# Patient Record
Sex: FEMALE | Race: WHITE | NOT HISPANIC OR LATINO | Employment: OTHER | URBAN - METROPOLITAN AREA
[De-identification: names, ages, dates, MRNs, and addresses within clinical notes are randomized per-mention and may not be internally consistent; named-entity substitution may affect disease eponyms.]

---

## 2018-05-01 ENCOUNTER — RECORDS - HEALTHEAST (OUTPATIENT)
Dept: LAB | Facility: CLINIC | Age: 83
End: 2018-05-01

## 2018-05-04 LAB — BACTERIA SPEC CULT: ABNORMAL

## 2018-07-19 ENCOUNTER — RECORDS - HEALTHEAST (OUTPATIENT)
Dept: LAB | Facility: CLINIC | Age: 83
End: 2018-07-19

## 2018-07-20 ENCOUNTER — RECORDS - HEALTHEAST (OUTPATIENT)
Dept: LAB | Facility: CLINIC | Age: 83
End: 2018-07-20

## 2018-07-20 LAB
ALBUMIN SERPL-MCNC: 3.5 G/DL (ref 3.5–5)
ALP SERPL-CCNC: 121 U/L (ref 45–120)
ALT SERPL W P-5'-P-CCNC: <9 U/L (ref 0–45)
ANION GAP SERPL CALCULATED.3IONS-SCNC: 11 MMOL/L (ref 5–18)
AST SERPL W P-5'-P-CCNC: 18 U/L (ref 0–40)
BILIRUB SERPL-MCNC: 0.4 MG/DL (ref 0–1)
BUN SERPL-MCNC: 31 MG/DL (ref 8–28)
CALCIUM SERPL-MCNC: 9.4 MG/DL (ref 8.5–10.5)
CHLORIDE BLD-SCNC: 103 MMOL/L (ref 98–107)
CO2 SERPL-SCNC: 27 MMOL/L (ref 22–31)
CREAT SERPL-MCNC: 1.29 MG/DL (ref 0.6–1.1)
GFR SERPL CREATININE-BSD FRML MDRD: 39 ML/MIN/1.73M2
GLUCOSE BLD-MCNC: 118 MG/DL (ref 70–125)
POTASSIUM BLD-SCNC: 4.7 MMOL/L (ref 3.5–5)
PROT SERPL-MCNC: 6.9 G/DL (ref 6–8)
SODIUM SERPL-SCNC: 141 MMOL/L (ref 136–145)
VIT B12 SERPL-MCNC: 636 PG/ML (ref 213–816)

## 2018-07-21 LAB — BACTERIA SPEC CULT: NO GROWTH

## 2018-08-23 ENCOUNTER — RECORDS - HEALTHEAST (OUTPATIENT)
Dept: LAB | Facility: CLINIC | Age: 83
End: 2018-08-23

## 2018-08-26 LAB — BACTERIA SPEC CULT: ABNORMAL

## 2018-09-06 ENCOUNTER — RECORDS - HEALTHEAST (OUTPATIENT)
Dept: LAB | Facility: CLINIC | Age: 83
End: 2018-09-06

## 2018-09-08 LAB — BACTERIA SPEC CULT: NO GROWTH

## 2018-10-22 ENCOUNTER — COMMUNICATION - HEALTHEAST (OUTPATIENT)
Dept: GERIATRICS | Facility: CLINIC | Age: 83
End: 2018-10-22

## 2018-10-23 ENCOUNTER — OFFICE VISIT - HEALTHEAST (OUTPATIENT)
Dept: GERIATRICS | Facility: CLINIC | Age: 83
End: 2018-10-23

## 2018-10-23 DIAGNOSIS — J44.9 CHRONIC OBSTRUCTIVE PULMONARY DISEASE, UNSPECIFIED COPD TYPE (H): ICD-10-CM

## 2018-10-23 DIAGNOSIS — M25.551 RIGHT HIP PAIN: ICD-10-CM

## 2018-10-23 DIAGNOSIS — I25.83 CORONARY ARTERY DISEASE DUE TO LIPID RICH PLAQUE: ICD-10-CM

## 2018-10-23 DIAGNOSIS — R53.81 PHYSICAL DECONDITIONING: ICD-10-CM

## 2018-10-23 DIAGNOSIS — K59.09 OTHER CONSTIPATION: ICD-10-CM

## 2018-10-23 DIAGNOSIS — I25.10 CORONARY ARTERY DISEASE DUE TO LIPID RICH PLAQUE: ICD-10-CM

## 2018-10-23 DIAGNOSIS — K21.9 GASTROESOPHAGEAL REFLUX DISEASE, ESOPHAGITIS PRESENCE NOT SPECIFIED: ICD-10-CM

## 2018-10-24 ENCOUNTER — OFFICE VISIT - HEALTHEAST (OUTPATIENT)
Dept: GERIATRICS | Facility: CLINIC | Age: 83
End: 2018-10-24

## 2018-10-24 DIAGNOSIS — K21.9 GASTROESOPHAGEAL REFLUX DISEASE, ESOPHAGITIS PRESENCE NOT SPECIFIED: ICD-10-CM

## 2018-10-24 DIAGNOSIS — I25.10 CORONARY ARTERY DISEASE DUE TO LIPID RICH PLAQUE: ICD-10-CM

## 2018-10-24 DIAGNOSIS — K59.00 CONSTIPATION: ICD-10-CM

## 2018-10-24 DIAGNOSIS — J44.9 CHRONIC OBSTRUCTIVE PULMONARY DISEASE, UNSPECIFIED COPD TYPE (H): ICD-10-CM

## 2018-10-24 DIAGNOSIS — R07.9 ACUTE CHEST PAIN: ICD-10-CM
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
## 2018-10-24 DIAGNOSIS — I25.83 CORONARY ARTERY DISEASE DUE TO LIPID RICH PLAQUE: ICD-10-CM

## 2018-10-24 DIAGNOSIS — R53.81 PHYSICAL DECONDITIONING: ICD-10-CM

## 2018-10-24 DIAGNOSIS — S70.01XA CONTUSION OF RIGHT HIP: ICD-10-CM

## 2018-10-24 ASSESSMENT — MIFFLIN-ST. JEOR: SCORE: 801.4

## 2018-10-25 ENCOUNTER — OFFICE VISIT - HEALTHEAST (OUTPATIENT)
Dept: GERIATRICS | Facility: CLINIC | Age: 83
End: 2018-10-25

## 2018-10-25 DIAGNOSIS — I50.9 CHRONIC CONGESTIVE HEART FAILURE, UNSPECIFIED HEART FAILURE TYPE (H): ICD-10-CM

## 2018-10-25 DIAGNOSIS — K59.09 OTHER CONSTIPATION: ICD-10-CM

## 2018-10-25 DIAGNOSIS — K85.90 PANCREATITIS: ICD-10-CM

## 2018-10-25 DIAGNOSIS — K21.9 GASTROESOPHAGEAL REFLUX DISEASE, ESOPHAGITIS PRESENCE NOT SPECIFIED: ICD-10-CM

## 2018-10-26 ENCOUNTER — RECORDS - HEALTHEAST (OUTPATIENT)
Dept: LAB | Facility: CLINIC | Age: 83
End: 2018-10-26

## 2018-10-26 ENCOUNTER — COMMUNICATION - HEALTHEAST (OUTPATIENT)
Dept: GERIATRICS | Facility: CLINIC | Age: 83
End: 2018-10-26

## 2018-10-26 LAB
FOLATE SERPL-MCNC: 15.5 NG/ML
LIPASE SERPL-CCNC: 59 U/L (ref 0–52)
VIT B12 SERPL-MCNC: 875 PG/ML (ref 213–816)

## 2018-10-30 ENCOUNTER — OFFICE VISIT - HEALTHEAST (OUTPATIENT)
Dept: GERIATRICS | Facility: CLINIC | Age: 83
End: 2018-10-30

## 2018-10-30 DIAGNOSIS — I25.83 CORONARY ARTERY DISEASE DUE TO LIPID RICH PLAQUE: ICD-10-CM

## 2018-10-30 DIAGNOSIS — J44.9 CHRONIC OBSTRUCTIVE PULMONARY DISEASE, UNSPECIFIED COPD TYPE (H): ICD-10-CM

## 2018-10-30 DIAGNOSIS — I50.9 CHRONIC CONGESTIVE HEART FAILURE, UNSPECIFIED HEART FAILURE TYPE (H): ICD-10-CM

## 2018-10-30 DIAGNOSIS — M25.551 RIGHT HIP PAIN: ICD-10-CM

## 2018-10-30 DIAGNOSIS — I25.10 CORONARY ARTERY DISEASE DUE TO LIPID RICH PLAQUE: ICD-10-CM

## 2018-10-31 ENCOUNTER — OFFICE VISIT - HEALTHEAST (OUTPATIENT)
Dept: GERIATRICS | Facility: CLINIC | Age: 83
End: 2018-10-31

## 2018-10-31 DIAGNOSIS — R07.9 CHEST PAIN: ICD-10-CM

## 2018-10-31 DIAGNOSIS — K21.9 GASTROESOPHAGEAL REFLUX DISEASE, ESOPHAGITIS PRESENCE NOT SPECIFIED: ICD-10-CM

## 2018-10-31 DIAGNOSIS — M25.551 RIGHT HIP PAIN: ICD-10-CM

## 2018-10-31 DIAGNOSIS — R62.7 FAILURE TO THRIVE IN ADULT: ICD-10-CM

## 2018-11-01 ENCOUNTER — RECORDS - HEALTHEAST (OUTPATIENT)
Dept: LAB | Facility: CLINIC | Age: 83
End: 2018-11-01

## 2018-11-01 LAB — TROPONIN I SERPL-MCNC: <0.01 NG/ML (ref 0–0.29)

## 2018-11-06 ENCOUNTER — OFFICE VISIT - HEALTHEAST (OUTPATIENT)
Dept: GERIATRICS | Facility: CLINIC | Age: 83
End: 2018-11-06

## 2018-11-06 DIAGNOSIS — M25.551 RIGHT HIP PAIN: ICD-10-CM

## 2018-11-06 DIAGNOSIS — K21.9 GASTROESOPHAGEAL REFLUX DISEASE, ESOPHAGITIS PRESENCE NOT SPECIFIED: ICD-10-CM

## 2018-11-06 DIAGNOSIS — I20.89 OTHER FORMS OF ANGINA PECTORIS (H): ICD-10-CM

## 2018-11-06 DIAGNOSIS — R53.81 PHYSICAL DECONDITIONING: ICD-10-CM

## 2018-11-14 ENCOUNTER — OFFICE VISIT - HEALTHEAST (OUTPATIENT)
Dept: GERIATRICS | Facility: CLINIC | Age: 83
End: 2018-11-14

## 2018-11-14 DIAGNOSIS — I20.89 OTHER FORMS OF ANGINA PECTORIS (H): ICD-10-CM

## 2018-11-14 DIAGNOSIS — J44.9 CHRONIC OBSTRUCTIVE PULMONARY DISEASE, UNSPECIFIED COPD TYPE (H): ICD-10-CM

## 2018-11-14 DIAGNOSIS — I50.9 CHRONIC CONGESTIVE HEART FAILURE, UNSPECIFIED HEART FAILURE TYPE (H): ICD-10-CM

## 2018-11-14 DIAGNOSIS — M25.551 RIGHT HIP PAIN: ICD-10-CM

## 2018-11-14 DIAGNOSIS — K21.9 GASTROESOPHAGEAL REFLUX DISEASE, ESOPHAGITIS PRESENCE NOT SPECIFIED: ICD-10-CM

## 2018-11-16 ENCOUNTER — AMBULATORY - HEALTHEAST (OUTPATIENT)
Dept: GERIATRICS | Facility: CLINIC | Age: 83
End: 2018-11-16

## 2019-01-10 ENCOUNTER — RECORDS - HEALTHEAST (OUTPATIENT)
Dept: LAB | Facility: CLINIC | Age: 84
End: 2019-01-10

## 2019-01-11 LAB — BACTERIA SPEC CULT: NO GROWTH

## 2019-01-21 ASSESSMENT — MIFFLIN-ST. JEOR: SCORE: 770.1

## 2019-01-22 ENCOUNTER — SURGERY - HEALTHEAST (OUTPATIENT)
Dept: SURGERY | Facility: HOSPITAL | Age: 84
End: 2019-01-22

## 2019-01-22 ENCOUNTER — ANESTHESIA - HEALTHEAST (OUTPATIENT)
Dept: SURGERY | Facility: HOSPITAL | Age: 84
End: 2019-01-22

## 2019-01-22 ASSESSMENT — MIFFLIN-ST. JEOR: SCORE: 793.23

## 2019-01-23 ASSESSMENT — MIFFLIN-ST. JEOR: SCORE: 792.78

## 2019-01-24 ENCOUNTER — HOME CARE/HOSPICE - HEALTHEAST (OUTPATIENT)
Dept: HOME HEALTH SERVICES | Facility: HOME HEALTH | Age: 84
End: 2019-01-24

## 2019-01-27 ENCOUNTER — HOME CARE/HOSPICE - HEALTHEAST (OUTPATIENT)
Dept: HOME HEALTH SERVICES | Facility: HOME HEALTH | Age: 84
End: 2019-01-27

## 2019-01-30 ENCOUNTER — HOME CARE/HOSPICE - HEALTHEAST (OUTPATIENT)
Dept: HOME HEALTH SERVICES | Facility: HOME HEALTH | Age: 84
End: 2019-01-30

## 2019-02-01 ENCOUNTER — HOME CARE/HOSPICE - HEALTHEAST (OUTPATIENT)
Dept: HOME HEALTH SERVICES | Facility: HOME HEALTH | Age: 84
End: 2019-02-01

## 2019-02-04 ENCOUNTER — HOME CARE/HOSPICE - HEALTHEAST (OUTPATIENT)
Dept: HOME HEALTH SERVICES | Facility: HOME HEALTH | Age: 84
End: 2019-02-04

## 2019-02-08 ENCOUNTER — HOME CARE/HOSPICE - HEALTHEAST (OUTPATIENT)
Dept: HOME HEALTH SERVICES | Facility: HOME HEALTH | Age: 84
End: 2019-02-08

## 2019-03-20 ENCOUNTER — RECORDS - HEALTHEAST (OUTPATIENT)
Dept: LAB | Facility: CLINIC | Age: 84
End: 2019-03-20

## 2019-03-21 LAB
ALBUMIN SERPL-MCNC: 3.4 G/DL (ref 3.5–5)
ALP SERPL-CCNC: 230 U/L (ref 45–120)
ALT SERPL W P-5'-P-CCNC: 18 U/L (ref 0–45)
ANION GAP SERPL CALCULATED.3IONS-SCNC: 11 MMOL/L (ref 5–18)
AST SERPL W P-5'-P-CCNC: 29 U/L (ref 0–40)
BILIRUB SERPL-MCNC: 0.4 MG/DL (ref 0–1)
BUN SERPL-MCNC: 18 MG/DL (ref 8–28)
CALCIUM SERPL-MCNC: 9.3 MG/DL (ref 8.5–10.5)
CHLORIDE BLD-SCNC: 108 MMOL/L (ref 98–107)
CO2 SERPL-SCNC: 24 MMOL/L (ref 22–31)
CREAT SERPL-MCNC: 1.05 MG/DL (ref 0.6–1.1)
GFR SERPL CREATININE-BSD FRML MDRD: 49 ML/MIN/1.73M2
GLUCOSE BLD-MCNC: 119 MG/DL (ref 70–125)
POTASSIUM BLD-SCNC: 4.7 MMOL/L (ref 3.5–5)
PROT SERPL-MCNC: 6.5 G/DL (ref 6–8)
SODIUM SERPL-SCNC: 143 MMOL/L (ref 136–145)
TSH SERPL DL<=0.005 MIU/L-ACNC: 3.06 UIU/ML (ref 0.3–5)
VIT B12 SERPL-MCNC: 975 PG/ML (ref 213–816)

## 2019-05-15 ENCOUNTER — RECORDS - HEALTHEAST (OUTPATIENT)
Dept: LAB | Facility: CLINIC | Age: 84
End: 2019-05-15

## 2019-05-15 LAB
ALBUMIN SERPL-MCNC: 3.6 G/DL (ref 3.5–5)
ALP SERPL-CCNC: 127 U/L (ref 45–120)
ALT SERPL W P-5'-P-CCNC: 10 U/L (ref 0–45)
ANION GAP SERPL CALCULATED.3IONS-SCNC: 9 MMOL/L (ref 5–18)
AST SERPL W P-5'-P-CCNC: 17 U/L (ref 0–40)
BILIRUB SERPL-MCNC: 0.4 MG/DL (ref 0–1)
BUN SERPL-MCNC: 24 MG/DL (ref 8–28)
CALCIUM SERPL-MCNC: 9.6 MG/DL (ref 8.5–10.5)
CHLORIDE BLD-SCNC: 106 MMOL/L (ref 98–107)
CO2 SERPL-SCNC: 27 MMOL/L (ref 22–31)
CREAT SERPL-MCNC: 1.15 MG/DL (ref 0.6–1.1)
GFR SERPL CREATININE-BSD FRML MDRD: 44 ML/MIN/1.73M2
GLUCOSE BLD-MCNC: 107 MG/DL (ref 70–125)
POTASSIUM BLD-SCNC: 4.6 MMOL/L (ref 3.5–5)
PROT SERPL-MCNC: 6.6 G/DL (ref 6–8)
SODIUM SERPL-SCNC: 142 MMOL/L (ref 136–145)

## 2019-08-20 ENCOUNTER — RECORDS - HEALTHEAST (OUTPATIENT)
Dept: LAB | Facility: CLINIC | Age: 84
End: 2019-08-20

## 2019-08-24 LAB — BACTERIA SPEC CULT: NORMAL

## 2019-09-04 ENCOUNTER — RECORDS - HEALTHEAST (OUTPATIENT)
Dept: LAB | Facility: CLINIC | Age: 84
End: 2019-09-04

## 2019-09-05 LAB — BACTERIA SPEC CULT: NO GROWTH

## 2019-09-18 ENCOUNTER — RECORDS - HEALTHEAST (OUTPATIENT)
Dept: LAB | Facility: CLINIC | Age: 84
End: 2019-09-18

## 2019-09-19 LAB
ALBUMIN SERPL-MCNC: 3.7 G/DL (ref 3.5–5)
ALP SERPL-CCNC: 143 U/L (ref 45–120)
ALT SERPL W P-5'-P-CCNC: 12 U/L (ref 0–45)
ANION GAP SERPL CALCULATED.3IONS-SCNC: 7 MMOL/L (ref 5–18)
AST SERPL W P-5'-P-CCNC: 18 U/L (ref 0–40)
BILIRUB SERPL-MCNC: 0.4 MG/DL (ref 0–1)
BUN SERPL-MCNC: 20 MG/DL (ref 8–28)
CALCIUM SERPL-MCNC: 9.2 MG/DL (ref 8.5–10.5)
CHLORIDE BLD-SCNC: 107 MMOL/L (ref 98–107)
CO2 SERPL-SCNC: 28 MMOL/L (ref 22–31)
CREAT SERPL-MCNC: 1.18 MG/DL (ref 0.6–1.1)
GFR SERPL CREATININE-BSD FRML MDRD: 43 ML/MIN/1.73M2
GLUCOSE BLD-MCNC: 111 MG/DL (ref 70–125)
POTASSIUM BLD-SCNC: 4.6 MMOL/L (ref 3.5–5)
PROT SERPL-MCNC: 6.5 G/DL (ref 6–8)
SODIUM SERPL-SCNC: 142 MMOL/L (ref 136–145)

## 2019-11-11 ENCOUNTER — RECORDS - HEALTHEAST (OUTPATIENT)
Dept: LAB | Facility: CLINIC | Age: 84
End: 2019-11-11

## 2019-11-12 LAB — VIT B12 SERPL-MCNC: 583 PG/ML (ref 213–816)

## 2020-01-31 ENCOUNTER — RECORDS - HEALTHEAST (OUTPATIENT)
Dept: LAB | Facility: CLINIC | Age: 85
End: 2020-01-31

## 2020-02-02 LAB — BACTERIA SPEC CULT: NO GROWTH

## 2020-02-19 ENCOUNTER — RECORDS - HEALTHEAST (OUTPATIENT)
Dept: LAB | Facility: CLINIC | Age: 85
End: 2020-02-19

## 2020-02-21 LAB — BACTERIA SPEC CULT: NO GROWTH

## 2020-04-30 ENCOUNTER — RECORDS - HEALTHEAST (OUTPATIENT)
Dept: LAB | Facility: CLINIC | Age: 85
End: 2020-04-30

## 2020-05-01 LAB — BACTERIA SPEC CULT: NO GROWTH

## 2020-08-02 ENCOUNTER — RECORDS - HEALTHEAST (OUTPATIENT)
Dept: LAB | Facility: CLINIC | Age: 85
End: 2020-08-02

## 2020-08-02 LAB
ALBUMIN UR-MCNC: ABNORMAL MG/DL
APPEARANCE UR: ABNORMAL
BACTERIA #/AREA URNS HPF: ABNORMAL HPF
BILIRUB UR QL STRIP: NEGATIVE
COLOR UR AUTO: YELLOW
GLUCOSE UR STRIP-MCNC: NEGATIVE MG/DL
HGB UR QL STRIP: ABNORMAL
KETONES UR STRIP-MCNC: NEGATIVE MG/DL
LEUKOCYTE ESTERASE UR QL STRIP: ABNORMAL
NITRATE UR QL: NEGATIVE
PH UR STRIP: 6.5 [PH] (ref 4.5–8)
RBC #/AREA URNS AUTO: ABNORMAL HPF
SP GR UR STRIP: 1.02 (ref 1–1.03)
SQUAMOUS #/AREA URNS AUTO: ABNORMAL LPF
UROBILINOGEN UR STRIP-ACNC: ABNORMAL
WBC #/AREA URNS AUTO: >100 HPF
WBC CLUMPS #/AREA URNS HPF: PRESENT /[HPF]

## 2020-08-04 LAB — BACTERIA SPEC CULT: ABNORMAL

## 2020-09-21 ENCOUNTER — RECORDS - HEALTHEAST (OUTPATIENT)
Dept: LAB | Facility: CLINIC | Age: 85
End: 2020-09-21

## 2020-09-24 LAB
ALBUMIN SERPL-MCNC: 3.4 G/DL (ref 3.5–5)
ALP SERPL-CCNC: 132 U/L (ref 45–120)
ALT SERPL W P-5'-P-CCNC: 13 U/L (ref 0–45)
ANION GAP SERPL CALCULATED.3IONS-SCNC: 10 MMOL/L (ref 5–18)
AST SERPL W P-5'-P-CCNC: 21 U/L (ref 0–40)
BILIRUB SERPL-MCNC: 0.4 MG/DL (ref 0–1)
BUN SERPL-MCNC: 18 MG/DL (ref 8–28)
CALCIUM SERPL-MCNC: 8.8 MG/DL (ref 8.5–10.5)
CHLORIDE BLD-SCNC: 106 MMOL/L (ref 98–107)
CO2 SERPL-SCNC: 25 MMOL/L (ref 22–31)
CREAT SERPL-MCNC: 1.3 MG/DL (ref 0.6–1.1)
GFR SERPL CREATININE-BSD FRML MDRD: 38 ML/MIN/1.73M2
GLUCOSE BLD-MCNC: 111 MG/DL (ref 70–125)
POTASSIUM BLD-SCNC: 4.3 MMOL/L (ref 3.5–5)
PROT SERPL-MCNC: 6.5 G/DL (ref 6–8)
SODIUM SERPL-SCNC: 141 MMOL/L (ref 136–145)

## 2020-10-24 ENCOUNTER — RECORDS - HEALTHEAST (OUTPATIENT)
Dept: LAB | Facility: CLINIC | Age: 85
End: 2020-10-24

## 2020-10-26 LAB — BACTERIA SPEC CULT: ABNORMAL

## 2020-11-24 ENCOUNTER — COMMUNICATION - HEALTHEAST (OUTPATIENT)
Dept: SCHEDULING | Facility: CLINIC | Age: 85
End: 2020-11-24

## 2020-11-24 ENCOUNTER — RECORDS - HEALTHEAST (OUTPATIENT)
Dept: LAB | Facility: CLINIC | Age: 85
End: 2020-11-24

## 2020-11-24 LAB
BASOPHILS # BLD AUTO: 0 THOU/UL (ref 0–0.2)
BASOPHILS NFR BLD AUTO: 1 % (ref 0–2)
BNP SERPL-MCNC: 425 PG/ML (ref 0–167)
EOSINOPHIL # BLD AUTO: 0.6 THOU/UL (ref 0–0.4)
EOSINOPHIL NFR BLD AUTO: 9 % (ref 0–6)
ERYTHROCYTE [DISTWIDTH] IN BLOOD BY AUTOMATED COUNT: 12.4 % (ref 11–14.5)
HCT VFR BLD AUTO: 33 % (ref 35–47)
HGB BLD-MCNC: 10.8 G/DL (ref 12–16)
IMM GRANULOCYTES # BLD: 0 THOU/UL
IMM GRANULOCYTES NFR BLD: 0 %
LYMPHOCYTES # BLD AUTO: 2 THOU/UL (ref 0.8–4.4)
LYMPHOCYTES NFR BLD AUTO: 30 % (ref 20–40)
MCH RBC QN AUTO: 34.4 PG (ref 27–34)
MCHC RBC AUTO-ENTMCNC: 32.7 G/DL (ref 32–36)
MCV RBC AUTO: 105 FL (ref 80–100)
MONOCYTES # BLD AUTO: 0.5 THOU/UL (ref 0–0.9)
MONOCYTES NFR BLD AUTO: 7 % (ref 2–10)
NEUTROPHILS # BLD AUTO: 3.5 THOU/UL (ref 2–7.7)
NEUTROPHILS NFR BLD AUTO: 53 % (ref 50–70)
PLATELET # BLD AUTO: 172 THOU/UL (ref 140–440)
PMV BLD AUTO: 12.5 FL (ref 8.5–12.5)
RBC # BLD AUTO: 3.14 MILL/UL (ref 3.8–5.4)
WBC: 6.6 THOU/UL (ref 4–11)

## 2020-12-16 ENCOUNTER — RECORDS - HEALTHEAST (OUTPATIENT)
Dept: LAB | Facility: CLINIC | Age: 85
End: 2020-12-16

## 2020-12-17 LAB
ANION GAP SERPL CALCULATED.3IONS-SCNC: 12 MMOL/L (ref 5–18)
BUN SERPL-MCNC: 20 MG/DL (ref 8–28)
CALCIUM SERPL-MCNC: 8.5 MG/DL (ref 8.5–10.5)
CHLORIDE BLD-SCNC: 102 MMOL/L (ref 98–107)
CO2 SERPL-SCNC: 28 MMOL/L (ref 22–31)
CREAT SERPL-MCNC: 1.29 MG/DL (ref 0.6–1.1)
GFR SERPL CREATININE-BSD FRML MDRD: 38 ML/MIN/1.73M2
GLUCOSE BLD-MCNC: 114 MG/DL (ref 70–125)
POTASSIUM BLD-SCNC: 3.8 MMOL/L (ref 3.5–5)
SODIUM SERPL-SCNC: 142 MMOL/L (ref 136–145)

## 2021-02-19 ENCOUNTER — RECORDS - HEALTHEAST (OUTPATIENT)
Dept: LAB | Facility: CLINIC | Age: 86
End: 2021-02-19

## 2021-02-19 LAB — VIT B12 SERPL-MCNC: 604 PG/ML (ref 213–816)

## 2021-03-03 ENCOUNTER — RECORDS - HEALTHEAST (OUTPATIENT)
Dept: LAB | Facility: CLINIC | Age: 86
End: 2021-03-03

## 2021-03-04 LAB
BASOPHILS # BLD AUTO: 0 THOU/UL (ref 0–0.2)
BASOPHILS NFR BLD AUTO: 1 % (ref 0–2)
EOSINOPHIL # BLD AUTO: 0.6 THOU/UL (ref 0–0.4)
EOSINOPHIL NFR BLD AUTO: 9 % (ref 0–6)
ERYTHROCYTE [DISTWIDTH] IN BLOOD BY AUTOMATED COUNT: 12.4 % (ref 11–14.5)
ERYTHROCYTE [SEDIMENTATION RATE] IN BLOOD BY WESTERGREN METHOD: 35 MM/HR (ref 0–20)
HCT VFR BLD AUTO: 30.4 % (ref 35–47)
HGB BLD-MCNC: 10.1 G/DL (ref 12–16)
IMM GRANULOCYTES # BLD: 0 THOU/UL
IMM GRANULOCYTES NFR BLD: 0 %
LYMPHOCYTES # BLD AUTO: 1.6 THOU/UL (ref 0.8–4.4)
LYMPHOCYTES NFR BLD AUTO: 23 % (ref 20–40)
MCH RBC QN AUTO: 34.5 PG (ref 27–34)
MCHC RBC AUTO-ENTMCNC: 33.2 G/DL (ref 32–36)
MCV RBC AUTO: 104 FL (ref 80–100)
MONOCYTES # BLD AUTO: 0.4 THOU/UL (ref 0–0.9)
MONOCYTES NFR BLD AUTO: 6 % (ref 2–10)
NEUTROPHILS # BLD AUTO: 4.2 THOU/UL (ref 2–7.7)
NEUTROPHILS NFR BLD AUTO: 61 % (ref 50–70)
PLATELET # BLD AUTO: 211 THOU/UL (ref 140–440)
PMV BLD AUTO: 11.9 FL (ref 8.5–12.5)
RBC # BLD AUTO: 2.93 MILL/UL (ref 3.8–5.4)
WBC: 6.8 THOU/UL (ref 4–11)

## 2021-03-15 ENCOUNTER — AMBULATORY - HEALTHEAST (OUTPATIENT)
Dept: NURSING | Facility: CLINIC | Age: 86
End: 2021-03-15

## 2021-04-05 ENCOUNTER — AMBULATORY - HEALTHEAST (OUTPATIENT)
Dept: NURSING | Facility: CLINIC | Age: 86
End: 2021-04-05

## 2021-06-02 VITALS — BODY MASS INDEX: 20.62 KG/M2 | HEIGHT: 60 IN | WEIGHT: 105 LBS

## 2021-06-02 VITALS — BODY MASS INDEX: 19.54 KG/M2 | WEIGHT: 103.4 LBS

## 2021-06-02 VITALS — WEIGHT: 103.4 LBS | BODY MASS INDEX: 19.54 KG/M2

## 2021-06-02 VITALS — BODY MASS INDEX: 19.52 KG/M2 | WEIGHT: 103.4 LBS | HEIGHT: 61 IN

## 2021-06-16 PROBLEM — R07.9 ACUTE CHEST PAIN: Status: ACTIVE | Noted: 2018-10-19

## 2021-06-16 PROBLEM — R06.02 SHORTNESS OF BREATH: Status: ACTIVE | Noted: 2018-10-13

## 2021-06-16 PROBLEM — K81.9 CHOLECYSTITIS: Status: ACTIVE | Noted: 2019-01-21

## 2021-06-16 PROBLEM — R07.9 CHEST PAIN: Status: ACTIVE | Noted: 2018-10-20

## 2021-06-16 PROBLEM — R10.9 ABDOMINAL PAIN: Status: ACTIVE | Noted: 2019-01-21
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
## 2021-06-16 PROBLEM — M25.551 RIGHT HIP PAIN: Status: ACTIVE | Noted: 2018-10-15

## 2021-06-16 PROBLEM — K59.09 OTHER CONSTIPATION: Status: ACTIVE | Noted: 2018-10-17

## 2021-06-16 PROBLEM — R06.02 SOB (SHORTNESS OF BREATH): Status: ACTIVE | Noted: 2018-10-13

## 2021-06-16 PROBLEM — R91.1 NODULE OF UPPER LOBE OF RIGHT LUNG: Chronic | Status: ACTIVE | Noted: 2018-10-16

## 2021-06-19 ENCOUNTER — HEALTH MAINTENANCE LETTER (OUTPATIENT)
Age: 86
End: 2021-06-19

## 2021-06-21 NOTE — PROGRESS NOTES
[facility]    Facility:   [facility] [[id number]]   Code Status: DNR      CHIEF COMPLAINT/REASON FOR VISIT:  Chief Complaint   Patient presents with     Follow Up       HISTORY:      HPI: Aleisha is a 89 y.o. female with a history CAD, CHF, COPD, pancreatitis, GERD with a recent hospitalization for hip pain after a fall at home.  She was discharged and readmitted from 10/19-10/21/2018 for complaints of chest pain that radiated from sternum to across back.  She was worked up for possible ACS and troponins, EKG, and Echo were negative.  During hospitalization there was concern for borderline hypotension with the SBPs in the upper 90s, so lasix was changed to every other day. She also had a high lipase but was improving in the 50s. She is admitted to the TCU for ongoing rehabilitation due to deconditioning related to recent fall and illness.     Seen today for TCU follow up.  She is sitting comfortably in the wheelchair and denies any pain.  She has oxycodone for pain.  Her BPs have some fluctuation and are borderline soft 103-151s/50-70s.  We decreased her Metoprolol last week to 12.5mg and she is on Lasix 20mg every other day.  She states she has some dizziness at times.  She reports having normal BMs with the miralax added in.   After visit, nurse called stating while in PT patient complained of new increased right hip pain, will have xray done.     Past Medical History:   Diagnosis Date     Acute pancreatitis      Arthritis      ASHD (arteriosclerotic heart disease)     80% mid LAD, 70% mid distal LAD, 90% L Cx     B12 deficiency      Bilateral cataracts     History -surgery completed     CAD S/P percutaneous coronary angioplasty 04/2015    RCA, diagonal     CHF (congestive heart failure) (H)      COPD (chronic obstructive pulmonary disease) (H)      Depression      Dermatitis     Left back-itchy rash     Diverticulitis      GERD (gastroesophageal reflux disease)      Idiopathic  pancreatitis      Left wrist fracture 10/01/2015     Lung nodule      Osteopenia      Pancreas divisum      Retinal detachment      STEMI (ST elevation myocardial infarction) (H) 04/2015             Family History   Problem Relation Age of Onset     Diabetes Mother      Heart disease Father      Heart attack Father      Social History     Social History     Marital status:      Spouse name: N/A     Number of children: N/A     Years of education: N/A     Social History Main Topics     Smoking status: Never Smoker     Smokeless tobacco: Never Used     Alcohol use Yes      Comment: on special occasions/holidays     Drug use: No     Sexual activity: Not Currently     Other Topics Concern     Not on file     Social History Narrative         Review of Systems    Patient denies fever, chills, headache, lightheadedness, rhinorrhea, cough, congestion, shortness of breath, chest pain, palpitations, abdominal pain, n/v, diarrhea, constipation, change in appetite, dysuria, frequency, burning or pain with urination.  Otherwise review of systems are negative.     Physical Exam  Vital signs: /52, HR 78, Resp 18, Temp 96.8, Wt: 108.8lbs  GENERAL APPEARANCE: Well developed, well nourished, in no acute distress.  HEENT: normocephalic, atraumatic  PERRL, sclerae anicteric, conjunctivae pink and moist, EOM intact  NECK: Supple and symmetric. Trachea is midline, no thyromegaly, no adenopathy, and no tenderness  LUNGS: Lung sounds CTA, no adventitious sounds, respiratory effort normal.  CARD: RRR, S1, S2, without murmurs, gallops, rubs, no JVD, peripheral pulses 2+ and symmetric  ABD: Soft and nontender with normal bowel sounds.   MSK: Muscle strength and tone were normal.  EXTREMITIES: No cyanosis, clubbing or edema.  NEURO: Alert and oriented x 3 some cognitive impairment. Normal affect. Sensation to touch was normal. Face is symmetric.  SKIN: Inspection of the skin reveals no rashes, ulcerations or petechiae.  PSYCH:  euthymic        LABS:   Recent Results (from the past 240 hour(s))   Folate, Serum   Result Value Ref Range    Folate 15.5 >=3.5 ng/mL   Lipase   Result Value Ref Range    Lipase 59 (H) 0 - 52 U/L   Vitamin B12   Result Value Ref Range    Vitamin B-12 875 (H) 213 - 816 pg/mL   Troponin I   Result Value Ref Range    Troponin I <0.01 0.00 - 0.29 ng/mL       ASSESSMENT:      ICD-10-CM    1. Right hip pain M25.551    2. Coronary artery disease due to lipid rich plaque I25.10     I25.83    3. Chronic congestive heart failure, unspecified heart failure type (H) I50.9    4. Chronic obstructive pulmonary disease, unspecified COPD type (H) J44.9        PLAN:    Right hip pain: continue same pain meds, xray of hip today  CAD: decreased lasix to 10mg every other day and hold metoprolol if SBP <110  CHF: stable decrease lasix  COPD: stable continue with same plan of care.     Kelley MERCADO CNP,saw and examined the patient and case discussed with Mckenzie Greer CNP.  Mckenzie MERCADO CNP, I examined the patient and discussed with Kelley Hernandez, and agree with the medical care given.    Electronically signed by: Mckenzie Greer CNP

## 2021-06-21 NOTE — PROGRESS NOTES
Code Status:  DNR  Visit Type: Follow Up     Facility:  [facility] [[id number]]        Facility Type: SNF (Skilled Nursing Facility, TCU)    History of Present Illness: Aleisha Singh is a 89 y.o. female  with a history CAD, CHF, COPD, pancreatitis, GERD with a recent hospitalization for hip pain after a fall at home.  She was discharged and readmitted from 10/19-10/21/2018 for complaints of chest pain that radiated from sternum to across back.  She was worked up for possible ACS and troponins, EKG, and Echo were negative.  During hospitalization there was concern for borderline hypotension with the SBPs in the upper 90s, so lasix was changed to every other day. She also had a high lipase but was improving in the 50s. She is admitted to the TCU for ongoing rehabilitation due to deconditioning related to recent fall and illness.     Last week she had an episode of GERD vs Angina in the middle of the night.  An EKG was ordered and it showed Sinus arrhythmia with 1st  Degree AV block, which is common for her.  Her BPs and HR are stable on Lasix and Metoprolol. She reports today no longer having issues with this discomfort.  She has Maalox with lidocaine and nitroglycerin prn and scheduled Imdur ordered.  She denies any pain today and it looks like she hasn't used the oxycodone x 4 days.  We will discontinue this and she continues to have a tylenol prn order.     Review of Systems   Patient denies fever, chills, headache, lightheadedness, dizziness, rhinorrhea, cough, congestion, shortness of breath, chest pain, palpitations, abdominal pain, n/v, diarrhea, constipation, change in appetite, dysuria, frequency, burning or pain with urination.  Otherwise review of systems are negative.         Physical Exam   Vital signs: 138/77, HR 71, Resp 18, temp 97.0 O2 100% on RA  Wt: 102.9lbs.   GENERAL APPEARANCE: Well developed, elderly female, in no acute distress.  HEENT: normocephalic, atraumatic  PERRL, sclerae  anicteric, conjunctivae pink and moist, EOM intact  NECK: Supple and symmetric. Trachea is midline, no thyromegaly, no adenopathy, and no tenderness  LUNGS: Lung sounds CTA, no adventitious sounds, respiratory effort normal.  CARD: RRR, S1, S2, systolic murmur present, peripheral pulses 2+ and symmetric  ABD: Soft and nontender with normal bowel sounds.   MSK: Muscle strength and tone were normal.  EXTREMITIES: No cyanosis, clubbing or edema.  NEURO: Alert with cognitive impairment. Normal affect. Sensation to touch was normal. Face is symmetric.  SKIN: Inspection of the skin reveals no rashes, ulcerations or petechiae.  PSYCH: euthymic      Labs:   Recent Results (from the past 240 hour(s))   Troponin I   Result Value Ref Range    Troponin I <0.01 0.00 - 0.29 ng/mL       Assessment:  1. Right hip pain     2. Physical deconditioning     3. Gastroesophageal reflux disease, esophagitis presence not specified     4. Other forms of angina pectoris (H)         Plan:   Pain: stable dc oxycodone order  GERD: stable continue same meds  Chest pain: stable continue same medications.     Kelley MERCADO CNP,saw and examined the patient and case discussed with Mckenzie Greer CNP.  Mckenzie MERCADO CNP, I examined the patient with Kelley Hernandez and discussed and agree with the medical care given.    Electronically signed by: Kelley Hernandez CNP

## 2021-06-21 NOTE — PROGRESS NOTES
[facility]    Facility:   [facility] [[id number]]   Code Status: DNR      CHIEF COMPLAINT/REASON FOR VISIT:  Chief Complaint   Patient presents with     Problem Visit     review labs and constipation       HISTORY:      HPI: Aleisha is a 89 y.o. female 89-year-old female with history of coronary artery disease, CHF, COPD, pancreatitis and GERD who was recently discharged from [facility] to a TCU after having right hip pain status post mechanical fall presented with chest pain across her sternum and radiating across her back.  She was given 2 nitroglycerin sublingual prior to medics arrival and medics administered 1 additional nitroglycerin.  She also reported shortness of breath and nausea without vomiting she was given 4 mg of IV Zofran.  In the hospital patient had serial troponins which were negative. EKG and echocardiogram were also negative for ACS.  Patient had no recurrence of chest pain in the hospital.  Her blood pressures soft at 98 therefore her lasix was changed  to every other day.     Today she is seen in her room. She had just completed a nebulizer. She denied CP or shortness of breath. She had labs that were to  be reviewed today but unfortunately they were not done today. She continues to report constipation despite adding miralax recently to her regimen. I will have nursing monitor daily BM's and give her a PRN suppository.       Past Medical History:   Diagnosis Date     Acute pancreatitis      Arthritis      ASHD (arteriosclerotic heart disease)     80% mid LAD, 70% mid distal LAD, 90% L Cx     B12 deficiency      Bilateral cataracts     History -surgery completed     CAD S/P percutaneous coronary angioplasty 04/2015    RCA, diagonal     CHF (congestive heart failure) (H)      COPD (chronic obstructive pulmonary disease) (H)      Depression      Dermatitis     Left back-itchy rash     Diverticulitis      GERD (gastroesophageal reflux disease)      Idiopathic  pancreatitis      Left wrist fracture 10/01/2015     Lung nodule      Osteopenia      Pancreas divisum      Retinal detachment      STEMI (ST elevation myocardial infarction) (H) 04/2015             Family History   Problem Relation Age of Onset     Diabetes Mother      Heart disease Father      Heart attack Father      Social History     Social History     Marital status:      Spouse name: N/A     Number of children: N/A     Years of education: N/A     Social History Main Topics     Smoking status: Never Smoker     Smokeless tobacco: Never Used     Alcohol use Yes      Comment: on special occasions/holidays     Drug use: No     Sexual activity: Not Currently     Other Topics Concern     Not on file     Social History Narrative         Review of Systems   Constitutional: Positive for fatigue. Negative for appetite change, chills and fever.   HENT: Negative for congestion and sore throat.    Respiratory: Negative for cough, shortness of breath and wheezing.    Cardiovascular: Negative for chest pain and leg swelling.   Gastrointestinal: Negative for abdominal distention, abdominal pain, constipation, diarrhea and nausea.   Genitourinary: Negative for dysuria.   Musculoskeletal: Negative for arthralgias and myalgias.   Skin: Negative for color change, rash and wound.   Neurological: Negative for dizziness and weakness.   Psychiatric/Behavioral: Positive for confusion. Negative for agitation, behavioral problems and sleep disturbance.       .  Vitals:    10/25/18 1445   BP: 120/63   Pulse: 68   Resp: 18   Temp: 97.3  F (36.3  C)   SpO2: 94%   Weight: 103 lb 6.4 oz (46.9 kg)       Physical Exam   Constitutional: She appears well-developed and well-nourished.   HENT:   Head: Normocephalic.   Eyes: Conjunctivae are normal.   Neck: Normal range of motion.   Cardiovascular: Normal rate and regular rhythm.    Murmur heard.  Pulmonary/Chest: Breath sounds normal. No respiratory distress. She has no wheezes. She has no  rales.   Abdominal: Soft. Bowel sounds are normal. She exhibits no distension. There is no tenderness.   Musculoskeletal: She exhibits no edema.   Decreased ROM RLE   Neurological: She is alert.   Oriented to month and name    Skin: Skin is warm.   Psychiatric: She has a normal mood and affect. Her behavior is normal.         LABS:   Recent Results (from the past 240 hour(s))   Basic Metabolic Panel   Result Value Ref Range    Sodium 144 136 - 145 mmol/L    Potassium 4.3 3.5 - 5.0 mmol/L    Chloride 105 98 - 107 mmol/L    CO2 25 22 - 31 mmol/L    Anion Gap, Calculation 14 5 - 18 mmol/L    Glucose 126 (H) 70 - 125 mg/dL    Calcium 9.3 8.5 - 10.5 mg/dL    BUN 25 8 - 28 mg/dL    Creatinine 1.34 (H) 0.60 - 1.10 mg/dL    GFR MDRD Af Amer 45 (L) >60 mL/min/1.73m2    GFR MDRD Non Af Amer 37 (L) >60 mL/min/1.73m2   HM2 (CBC W/O DIFF)   Result Value Ref Range    WBC 8.0 4.0 - 11.0 thou/uL    RBC 3.06 (L) 3.80 - 5.40 mill/uL    Hemoglobin 10.8 (L) 12.0 - 16.0 g/dL    Hematocrit 31.3 (L) 35.0 - 47.0 %     (H) 80 - 100 fL    MCH 35.3 (H) 27.0 - 34.0 pg    MCHC 34.5 32.0 - 36.0 g/dL    RDW 12.0 11.0 - 14.5 %    Platelets 181 140 - 440 thou/uL    MPV 10.8 8.5 - 12.5 fL   Basic metabolic panel   Result Value Ref Range    Sodium 143 136 - 145 mmol/L    Potassium 3.7 3.5 - 5.0 mmol/L    Chloride 107 98 - 107 mmol/L    CO2 27 22 - 31 mmol/L    Anion Gap, Calculation 9 5 - 18 mmol/L    Glucose 96 70 - 125 mg/dL    Calcium 9.3 8.5 - 10.5 mg/dL    BUN 25 8 - 28 mg/dL    Creatinine 1.32 (H) 0.60 - 1.10 mg/dL    GFR MDRD Af Amer 46 (L) >60 mL/min/1.73m2    GFR MDRD Non Af Amer 38 (L) >60 mL/min/1.73m2   Basic Metabolic Panel   Result Value Ref Range    Sodium 142 136 - 145 mmol/L    Potassium 3.8 3.5 - 5.0 mmol/L    Chloride 107 98 - 107 mmol/L    CO2 26 22 - 31 mmol/L    Anion Gap, Calculation 9 5 - 18 mmol/L    Glucose 91 70 - 125 mg/dL    Calcium 8.6 8.5 - 10.5 mg/dL    BUN 24 8 - 28 mg/dL    Creatinine 1.32 (H) 0.60 - 1.10  mg/dL    GFR MDRD Af Amer 46 (L) >60 mL/min/1.73m2    GFR MDRD Non Af Amer 38 (L) >60 mL/min/1.73m2   Platelet Count - every other day x 3   Result Value Ref Range    Platelets 155 140 - 440 thou/uL   ECG 12 lead with MUSE   Result Value Ref Range    SYSTOLIC BLOOD PRESSURE  mmHg    DIASTOLIC BLOOD PRESSURE  mmHg    VENTRICULAR RATE 91 BPM    ATRIAL RATE 91 BPM    P-R INTERVAL 188 ms    QRS DURATION 68 ms    Q-T INTERVAL 382 ms    QTC CALCULATION (BEZET) 469 ms    P Axis 90 degrees    R AXIS 56 degrees    T AXIS 120 degrees    MUSE DIAGNOSIS       Sinus rhythm with Premature atrial complexes  Nonspecific ST and T wave abnormality  Abnormal ECG  When compared with ECG of 13-OCT-2018 09:20,  No significant change was found  Confirmed by THUAN STEVE MD LOC: ([id number]) on 10/20/2018 2:25:31 PM     HM2 (CBC W/O DIFF)   Result Value Ref Range    WBC 9.2 4.0 - 11.0 thou/uL    RBC 3.29 (L) 3.80 - 5.40 mill/uL    Hemoglobin 11.6 (L) 12.0 - 16.0 g/dL    Hematocrit 33.5 (L) 35.0 - 47.0 %     (H) 80 - 100 fL    MCH 35.3 (H) 27.0 - 34.0 pg    MCHC 34.6 32.0 - 36.0 g/dL    RDW 12.1 11.0 - 14.5 %    Platelets 204 140 - 440 thou/uL    MPV 10.5 8.5 - 12.5 fL   Basic Metabolic Panel   Result Value Ref Range    Sodium 139 136 - 145 mmol/L    Potassium 3.8 3.5 - 5.0 mmol/L    Chloride 104 98 - 107 mmol/L    CO2 19 (L) 22 - 31 mmol/L    Anion Gap, Calculation 16 5 - 18 mmol/L    Glucose 119 70 - 125 mg/dL    Calcium 9.5 8.5 - 10.5 mg/dL    BUN 19 8 - 28 mg/dL    Creatinine 1.14 (H) 0.60 - 1.10 mg/dL    GFR MDRD Af Amer 54 (L) >60 mL/min/1.73m2    GFR MDRD Non Af Amer 44 (L) >60 mL/min/1.73m2   Troponin I   Result Value Ref Range    Troponin I <0.01 0.00 - 0.29 ng/mL   INR   Result Value Ref Range    INR 1.00 0.90 - 1.10   APTT   Result Value Ref Range    PTT 30 24 - 37 seconds   BNP(B-type Natriuretic Peptide)   Result Value Ref Range     (H) 0 - 167 pg/mL   Hepatic Profile   Result Value Ref Range    Bilirubin, Total  0.7 0.0 - 1.0 mg/dL    Bilirubin, Direct 0.2 <=0.5 mg/dL    Protein, Total 7.2 6.0 - 8.0 g/dL    Albumin 3.6 3.5 - 5.0 g/dL    Alkaline Phosphatase 118 45 - 120 U/L    AST 25 0 - 40 U/L    ALT 14 0 - 45 U/L   Lipase   Result Value Ref Range    Lipase 120 (H) 0 - 52 U/L   Troponin I   Result Value Ref Range    Troponin I <0.01 0.00 - 0.29 ng/mL   Lipase   Result Value Ref Range    Lipase 68 (H) 0 - 52 U/L   Echo Complete   Result Value Ref Range    HR 76 bpm    IVSd 0.855 0.6 - 0.9 cm    LVIDd 3.61 3.8 - 5.2 cm    LVIDs 2.61 2.2 - 3.5 cm    LVOT diam 2 cm    LVOT mean gradient 1 mmHg    LVOT peak VTI 21.7 cm    LVOT mean evelia 55.4 cm/s    LVOT peak evelia 89.4 cm/s    LVOT peak gradient 3 mmHg    LV PWd 0.961 0.6 - 0.9 cm    MV E' lat evelia 6.74 cm/s    MV E' med evelia 5.77 cm/s    AV mean evelia 208 cm/s    AV mean gradient 19 mmHg    AV VTI 59.5 cm    AV peak evelia 296 cm/s    MV decel slope 5140 mm/s2    MV decel time 236 ms    MV P 1/2 time 71 ms    MV peak A evelia 111 cm/s    MV peak E evelia 102 cm/s    MV mean evelia 87.2 cm/s    MV mean gradient 3 mmHg    MV VTI 32.3 cm    MV peak velocityoctiy 128 cm/s    BSA 1.41 m2    Hieght 61 in    Weight 1635.2 lbs    BP 97/55 mmHg    IVS/PW ratio 0.9     LV FS 27.7 28 - 44 %    LA volume 42.8 mL    LV mass 94.4 g    AV area 1.1 cm2    AV DIM IND evelia 0.3     MV area p 1/2 time 3.1 cm2    MV area cont eq 2.1 cm2    MV E/A Ratio 0.9     LVOT area 3.14 cm2    LVOT SV 68.1 cm3    AV peak gradient 35.0 mmHg    MV peak gradient 6.6 mmHg    LA volume index 30.4 mL/m2    LV mass index 66.9 g/m2    LV SVi 48.3 ml/m2    TAPSE 1.9 cm    MV med E/e' ratio 17.7     MV lat E/e' ratio 15.1     LV CO 5.2 l/min    LV Ci 3.7 l/min/m2    LA area 2 16.8 cm2    LA area 1 17.1 cm2    Height 61.0 in    Weight 102 lbs    MV Avg E/e' Ratio 16.3 cm/s    LA length 5.7 cm    AV DIM IND VTI 0.4     MVA VTI 2.11 cm2    Echo LVEF Estimated 55 %   Troponin I   Result Value Ref Range    Troponin I 0.01 0.00 - 0.29 ng/mL      Current Outpatient Prescriptions   Medication Sig Note     acetaminophen (TYLENOL) 325 MG tablet Take 2 tablets (650 mg total) by mouth every 4 (four) hours as needed.      aspirin 81 MG EC tablet Take 81 mg by mouth daily.      bisacodyl (DULCOLAX) 10 mg suppository One supp [place] q day prn constipation      cholecalciferol, vitamin D3, 1,000 unit tablet Take 2,000 Units by mouth daily.      ferrous sulfate 325 (65 FE) MG tablet Take 1 tablet by mouth daily.      FOLIC ACID/MULTIVIT-MIN/LUTEIN (CENTRUM SILVER ORAL) Take 1 tablet by mouth daily.      furosemide (LASIX) 20 MG tablet Take 1 tablet (20 mg total) by mouth every other day.      ipratropium-albuterol (DUO-NEB) 0.5-2.5 mg/3 mL nebulizer Inhale 3 mL 2 (two) times a day.       isosorbide mononitrate (IMDUR) 30 MG 24 hr tablet Take 1 tablet (30 mg total) by mouth daily.      loratadine (CLARITIN) 10 mg tablet Take 10 mg by mouth daily.       metoprolol tartrate (LOPRESSOR) 25 MG tablet Take 12.5 mg by mouth 2 (two) times a day.       mirtazapine (REMERON) 7.5 MG tablet Take 7.5 mg by mouth at bedtime.      nitroglycerin (NITROSTAT) 0.4 MG SL tablet Place 0.4 mg under the tongue every 5 (five) minutes as needed for chest pain.      omeprazole (PRILOSEC) 20 MG capsule Take 20 mg by mouth daily before breakfast.      ondansetron (ZOFRAN) 4 MG tablet Take 4 mg by mouth every 6 (six) hours as needed for nausea.      oxyCODONE-acetaminophen (PERCOCET/ENDOCET) 5-325 mg per tablet Take 0.5-1 tablets by mouth every 4 (four) hours as needed for pain. 0.5 tab if pain 4-6/10, or 1 tab if pain is 7-10/10      polyethylene glycol (MIRALAX) 17 gram packet Take 17 g by mouth daily.      senna-docusate (PERICOLACE) 8.6-50 mg tablet Take 1 tablet by mouth 2 (two) times a day. HOLD IF LOOSE STOOLS OR MORE THAN 3 BM/DAY      white petrolatum-mineral oil (REFRESH LACRI-LUBE) 56.8-42.5 % Oint Administer 1 application to the right eye 2 (two) times a day.  10/19/2018: Charted as  "\"other\" on [facility] MAR. Not sure what that means.       ASSESSMENT:      ICD-10-CM    1. Pancreatitis K85.90    2. Chronic congestive heart failure, unspecified heart failure type (H) I50.9    3. Other constipation K59.09    4. Gastroesophageal reflux disease, esophagitis presence not specified K21.9        PLAN:    GERD: continue omeprazole .  Zofran as needed  CAD: BPs lower;  Metoprolol recently decreased l to 12.5mg two times a day and BP stable at 120/63  COPD: continue on neb treatments   Hip pain: related to previous fall, continue Tylenol and Percocet PRN, If pain tolerated on tylenol will D/C Percocet  Physical deconditioning: continue with PT/OT  Constipation:  Miralax recently added  Monitor bowel status and document daily, Bisacodyl suppository PRN.    Electronically signed by: Mckenzie Greer CNP  "

## 2021-06-21 NOTE — PROGRESS NOTES
Code Status:  DNR  Visit Type: H & P     Facility:  [facility] [[id number]]      Facility Type: SNF (Skilled Nursing Facility, TCU)    History of Present Illness:   [facility]: [facility]  Facility Admission Date: 4/21 2019 [facility] transitional care unit    Aleisha Singh is a 89 y.o. female who was admitted to [facility] because of chest pain with an underlying history of coronary artery disease, CHF, COPD, remote pancreatitis and GERD.  She had been in the hospital and then TCU after having a fall mechanical in nature and some right hip pain.  At the time of her admission she was having pain across her sternum and radiating to her back.  She was given 2 nitroglycerin sublingual tabs prior to arrival and then the third 1 in the emergency department.  She had some dyspnea with it and nausea without vomiting and was given Zofran IV.    Hospital course in the hospital the patient had serial troponins which are negative and EKG and echocardiogram were negative for acute coronary syndrome.  There is no recurrence of chest pain in the hospital and her blood pressures often were low with systolics averaging 98 and they changed her Lasix to 20 mg every other day.  Based sent her then to us for the deconditioning.    Facility course; since being with us she had some nausea which is been well abated by preemptive Zofran but no chest pain.  She has had pain and she is designated a Jamar lift but she was able to stand pivot and transfer without pain and with fairly good balance.    Past Medical History:   Diagnosis Date     Acute pancreatitis      Arthritis      ASHD (arteriosclerotic heart disease)     80% mid LAD, 70% mid distal LAD, 90% L Cx     B12 deficiency      Bilateral cataracts     History -surgery completed     CAD S/P percutaneous coronary angioplasty 04/2015    RCA, diagonal     CHF (congestive  heart failure) (H)      COPD (chronic obstructive pulmonary disease) (H)      Depression      Dermatitis     Left back-itchy rash     Diverticulitis      GERD (gastroesophageal reflux disease)      Idiopathic pancreatitis      Left wrist fracture 10/01/2015     Lung nodule      Osteopenia      Pancreas divisum      Retinal detachment      STEMI (ST elevation myocardial infarction) (H) 2015     Past Surgical History:   Procedure Laterality Date     APPENDECTOMY       CATARACT EXTRACTION Left      CORONARY ANGIOPLASTY WITH STENT PLACEMENT  2015    Per patient report 6 cardiac stents placed     ERCP  2011     HYSTERECTOMY       ORIF TOE FRACTURE       TOTAL HIP ARTHROPLASTY Right      Family History   Problem Relation Age of Onset     Diabetes Mother      Heart disease Father      Heart attack Father      Social History     Social History     Marital status:      Spouse name: N/A     Number of children: N/A     Years of education: N/A     Occupational History     Not on file.     Social History Main Topics     Smoking status: Never Smoker     Smokeless tobacco: Never Used     Alcohol use Yes      Comment: on special occasions/holidays     Drug use: No     Sexual activity: Not Currently     Other Topics Concern     Not on file     Social History Narrative     Additional Geriatric Review [place] [facility] [place].  She was driving until 2 years ago.  She had 6 children her  is  by many years lost 1 girl but has 4 other girls and one boy.  She denies urinary incontinence was previously ambulating without an assistive device and no mental impairment teeth intact.  Does have poor vision and hearing is adequate  Current Outpatient Prescriptions   Medication Sig Dispense Refill     acetaminophen (TYLENOL) 325 MG tablet Take 2 tablets (650 mg total) by mouth every 4 (four) hours as needed.  0     aspirin 81 MG EC tablet Take 81 mg by mouth  daily.       bisacodyl (DULCOLAX) 10 mg suppository One supp MT q day prn constipation  0     cholecalciferol, vitamin D3, 1,000 unit tablet Take 2,000 Units by mouth daily.       ferrous sulfate 325 (65 FE) MG tablet Take 1 tablet by mouth daily.       FOLIC ACID/MULTIVIT-MIN/LUTEIN (CENTRUM SILVER ORAL) Take 1 tablet by mouth daily.       furosemide (LASIX) 20 MG tablet Take 1 tablet (20 mg total) by mouth every other day.  0     ipratropium-albuterol (DUO-NEB) 0.5-2.5 mg/3 mL nebulizer Inhale 3 mL 2 (two) times a day.        isosorbide mononitrate (IMDUR) 30 MG 24 hr tablet Take 1 tablet (30 mg total) by mouth daily. 30 tablet 0     loratadine (CLARITIN) 10 mg tablet Take 10 mg by mouth daily.        metoprolol tartrate (LOPRESSOR) 25 MG tablet Take 25 mg by mouth 2 (two) times a day.       mirtazapine (REMERON) 7.5 MG tablet Take 7.5 mg by mouth at bedtime.       nitroglycerin (NITROSTAT) 0.4 MG SL tablet Place 0.4 mg under the tongue every 5 (five) minutes as needed for chest pain.       omeprazole (PRILOSEC) 20 MG capsule Take 20 mg by mouth daily before breakfast.       ondansetron (ZOFRAN) 4 MG tablet Take 4 mg by mouth every 6 (six) hours as needed for nausea.       oxyCODONE-acetaminophen (PERCOCET/ENDOCET) 5-325 mg per tablet Take 0.5-1 tablets by mouth every 4 (four) hours as needed for pain. 0.5 tab if pain 4-6/10, or 1 tab if pain is 7-10/10 20 tablet 0     senna-docusate (PERICOLACE) 8.6-50 mg tablet Take 1 tablet by mouth 2 (two) times a day. HOLD IF LOOSE STOOLS OR MORE THAN 3 BM/DAY  0     white petrolatum-mineral oil (REFRESH LACRI-LUBE) 56.8-42.5 % Oint Administer 1 application to the right eye 2 (two) times a day.        No current facility-administered medications for this visit.      Allergies   Allergen Reactions     Ace Inhibitors Cough     Altace [Ramipril]      Hydrochlorothiazide      Morphine Nausea And Vomiting     Penicillins Hives     Simvastatin      Sulfa (Sulfonamide Antibiotics)       unknown     Immunization History   Administered Date(s) Administered     Influenza high dose, seasonal 10/14/2018     Influenza, inj, historic,unspecified 09/15/2015         Review of Systems   Constitutional: Negative.  Negative for appetite change, chills, diaphoresis, fatigue, fever and unexpected weight change.   HENT: Negative for congestion, dental problem, ear pain, hearing loss, nosebleeds, sinus pressure, sore throat, tinnitus and trouble swallowing.    Eyes: Negative for photophobia, pain, discharge, itching and visual disturbance.   Respiratory: Negative for apnea, cough, chest tightness, shortness of breath and wheezing.    Cardiovascular: Negative for chest pain and palpitations.   Gastrointestinal: Positive for nausea. Negative for abdominal distention, abdominal pain, constipation and diarrhea.   Endocrine: Negative for cold intolerance, heat intolerance and polydipsia.   Genitourinary: Negative.  Negative for decreased urine volume, difficulty urinating, dysuria, enuresis, frequency, hematuria, menstrual problem, urgency and vaginal discharge.   Musculoskeletal: Negative for arthralgias, back pain and gait problem.        Pain in the right hip   Allergic/Immunologic: Negative.    Neurological: Negative for dizziness, tremors, syncope, facial asymmetry, speech difficulty, weakness, light-headedness, numbness and headaches.   Hematological: Negative.    Psychiatric/Behavioral: Negative for agitation, behavioral problems, confusion, decreased concentration, dysphoric mood and hallucinations. The patient is not hyperactive.         Physical Exam   Constitutional: She is oriented to person, place, and time. She appears well-developed and well-nourished.   HENT:   Head: Normocephalic and atraumatic.   Right Ear: External ear normal.   Left Ear: External ear normal.   Nose: Nose normal.   Mouth/Throat: Oropharynx is clear and moist.   Eyes: Conjunctivae and EOM are normal. Pupils are equal, round, and  reactive to light. Left eye exhibits no discharge. No scleral icterus.   Neck: Neck supple. No JVD present. No tracheal deviation present. No thyromegaly present.   Cardiovascular: Normal rate, regular rhythm and normal heart sounds.  Exam reveals no friction rub.    No murmur heard.  Consistently a widened pulse pressure   Pulmonary/Chest: Effort normal and breath sounds normal. No respiratory distress. She has no wheezes. She has no rales. She exhibits no tenderness.   Abdominal: She exhibits no distension and no mass. There is no tenderness. There is no guarding.   Musculoskeletal: Normal range of motion. She exhibits no edema or tenderness.   Seen in palpation of the right hip without anatomical defect or bony and/or skin changes observed   Lymphadenopathy:     She has no cervical adenopathy.   Neurological: She is alert and oriented to person, place, and time. She has normal reflexes. No cranial nerve deficit. She exhibits normal muscle tone. Coordination normal.   Skin: Skin is warm and dry. No rash noted. No erythema.   Psychiatric: She has a normal mood and affect. Her behavior is normal. Thought content normal.         Labs:  All labs reviewed in the nursing home record.  Recent Results (from the past 240 hour(s))   Basic Metabolic Panel   Result Value Ref Range    Sodium 144 136 - 145 mmol/L    Potassium 4.3 3.5 - 5.0 mmol/L    Chloride 105 98 - 107 mmol/L    CO2 25 22 - 31 mmol/L    Anion Gap, Calculation 14 5 - 18 mmol/L    Glucose 126 (H) 70 - 125 mg/dL    Calcium 9.3 8.5 - 10.5 mg/dL    BUN 25 8 - 28 mg/dL    Creatinine 1.34 (H) 0.60 - 1.10 mg/dL    GFR MDRD Af Amer 45 (L) >60 mL/min/1.73m2    GFR MDRD Non Af Amer 37 (L) >60 mL/min/1.73m2   HM2 (CBC W/O DIFF)   Result Value Ref Range    WBC 8.0 4.0 - 11.0 thou/uL    RBC 3.06 (L) 3.80 - 5.40 mill/uL    Hemoglobin 10.8 (L) 12.0 - 16.0 g/dL    Hematocrit 31.3 (L) 35.0 - 47.0 %     (H) 80 - 100 fL    MCH 35.3 (H) 27.0 - 34.0 pg    MCHC 34.5 32.0 -  36.0 g/dL    RDW 12.0 11.0 - 14.5 %    Platelets 181 140 - 440 thou/uL    MPV 10.8 8.5 - 12.5 fL   Basic metabolic panel   Result Value Ref Range    Sodium 143 136 - 145 mmol/L    Potassium 3.7 3.5 - 5.0 mmol/L    Chloride 107 98 - 107 mmol/L    CO2 27 22 - 31 mmol/L    Anion Gap, Calculation 9 5 - 18 mmol/L    Glucose 96 70 - 125 mg/dL    Calcium 9.3 8.5 - 10.5 mg/dL    BUN 25 8 - 28 mg/dL    Creatinine 1.32 (H) 0.60 - 1.10 mg/dL    GFR MDRD Af Amer 46 (L) >60 mL/min/1.73m2    GFR MDRD Non Af Amer 38 (L) >60 mL/min/1.73m2   Basic Metabolic Panel   Result Value Ref Range    Sodium 142 136 - 145 mmol/L    Potassium 3.8 3.5 - 5.0 mmol/L    Chloride 107 98 - 107 mmol/L    CO2 26 22 - 31 mmol/L    Anion Gap, Calculation 9 5 - 18 mmol/L    Glucose 91 70 - 125 mg/dL    Calcium 8.6 8.5 - 10.5 mg/dL    BUN 24 8 - 28 mg/dL    Creatinine 1.32 (H) 0.60 - 1.10 mg/dL    GFR MDRD Af Amer 46 (L) >60 mL/min/1.73m2    GFR MDRD Non Af Amer 38 (L) >60 mL/min/1.73m2   Platelet Count - every other day x 3   Result Value Ref Range    Platelets 155 140 - 440 thou/uL   ECG 12 lead with MUSE   Result Value Ref Range    SYSTOLIC BLOOD PRESSURE  mmHg    DIASTOLIC BLOOD PRESSURE  mmHg    VENTRICULAR RATE 91 BPM    ATRIAL RATE 91 BPM    P-R INTERVAL 188 ms    QRS DURATION 68 ms    Q-T INTERVAL 382 ms    QTC CALCULATION (BEZET) 469 ms    P Axis 90 degrees    R AXIS 56 degrees    T AXIS 120 degrees    MUSE DIAGNOSIS       Sinus rhythm with Premature atrial complexes  Nonspecific ST and T wave abnormality  Abnormal ECG  When compared with ECG of 13-OCT-2018 09:20,  No significant change was found  Confirmed by THUAN STEVE MD LOC: ([id number]) on 10/20/2018 2:25:31 PM     HM2 (CBC W/O DIFF)   Result Value Ref Range    WBC 9.2 4.0 - 11.0 thou/uL    RBC 3.29 (L) 3.80 - 5.40 mill/uL    Hemoglobin 11.6 (L) 12.0 - 16.0 g/dL    Hematocrit 33.5 (L) 35.0 - 47.0 %     (H) 80 - 100 fL    MCH 35.3 (H) 27.0 - 34.0 pg    MCHC 34.6 32.0 - 36.0 g/dL    RDW  12.1 11.0 - 14.5 %    Platelets 204 140 - 440 thou/uL    MPV 10.5 8.5 - 12.5 fL   Basic Metabolic Panel   Result Value Ref Range    Sodium 139 136 - 145 mmol/L    Potassium 3.8 3.5 - 5.0 mmol/L    Chloride 104 98 - 107 mmol/L    CO2 19 (L) 22 - 31 mmol/L    Anion Gap, Calculation 16 5 - 18 mmol/L    Glucose 119 70 - 125 mg/dL    Calcium 9.5 8.5 - 10.5 mg/dL    BUN 19 8 - 28 mg/dL    Creatinine 1.14 (H) 0.60 - 1.10 mg/dL    GFR MDRD Af Amer 54 (L) >60 mL/min/1.73m2    GFR MDRD Non Af Amer 44 (L) >60 mL/min/1.73m2   Troponin I   Result Value Ref Range    Troponin I <0.01 0.00 - 0.29 ng/mL   INR   Result Value Ref Range    INR 1.00 0.90 - 1.10   APTT   Result Value Ref Range    PTT 30 24 - 37 seconds   BNP(B-type Natriuretic Peptide)   Result Value Ref Range     (H) 0 - 167 pg/mL   Hepatic Profile   Result Value Ref Range    Bilirubin, Total 0.7 0.0 - 1.0 mg/dL    Bilirubin, Direct 0.2 <=0.5 mg/dL    Protein, Total 7.2 6.0 - 8.0 g/dL    Albumin 3.6 3.5 - 5.0 g/dL    Alkaline Phosphatase 118 45 - 120 U/L    AST 25 0 - 40 U/L    ALT 14 0 - 45 U/L   Lipase   Result Value Ref Range    Lipase 120 (H) 0 - 52 U/L   Troponin I   Result Value Ref Range    Troponin I <0.01 0.00 - 0.29 ng/mL   Lipase   Result Value Ref Range    Lipase 68 (H) 0 - 52 U/L   Echo Complete   Result Value Ref Range    HR 76 bpm    IVSd 0.855 0.6 - 0.9 cm    LVIDd 3.61 3.8 - 5.2 cm    LVIDs 2.61 2.2 - 3.5 cm    LVOT diam 2 cm    LVOT mean gradient 1 mmHg    LVOT peak VTI 21.7 cm    LVOT mean evelia 55.4 cm/s    LVOT peak evelia 89.4 cm/s    LVOT peak gradient 3 mmHg    LV PWd 0.961 0.6 - 0.9 cm    MV E' lat evelia 6.74 cm/s    MV E' med evelia 5.77 cm/s    AV mean evelia 208 cm/s    AV mean gradient 19 mmHg    AV VTI 59.5 cm    AV peak evelia 296 cm/s    MV decel slope 5140 mm/s2    MV decel time 236 ms    MV P 1/2 time 71 ms    MV peak A evelia 111 cm/s    MV peak E evelia 102 cm/s    MV mean evelia 87.2 cm/s    MV mean gradient 3 mmHg    MV VTI 32.3 cm    MV peak  velocityoctiy 128 cm/s    BSA 1.41 m2    Hieght 61 in    Weight 1635.2 lbs    BP 97/55 mmHg    IVS/PW ratio 0.9     LV FS 27.7 28 - 44 %    LA volume 42.8 mL    LV mass 94.4 g    AV area 1.1 cm2    AV DIM IND evelia 0.3     MV area p 1/2 time 3.1 cm2    MV area cont eq 2.1 cm2    MV E/A Ratio 0.9     LVOT area 3.14 cm2    LVOT SV 68.1 cm3    AV peak gradient 35.0 mmHg    MV peak gradient 6.6 mmHg    LA volume index 30.4 mL/m2    LV mass index 66.9 g/m2    LV SVi 48.3 ml/m2    TAPSE 1.9 cm    MV med E/e' ratio 17.7     MV lat E/e' ratio 15.1     LV CO 5.2 l/min    LV Ci 3.7 l/min/m2    LA area 2 16.8 cm2    LA area 1 17.1 cm2    Height 61.0 in    Weight 102 lbs    MV Avg E/e' Ratio 16.3 cm/s    LA length 5.7 cm    AV DIM IND VTI 0.4     MVA VTI 2.11 cm2    Echo LVEF Estimated 55 %   Troponin I   Result Value Ref Range    Troponin I 0.01 0.00 - 0.29 ng/mL         Assessment:  1. Acute chest pain     2. Contusion of right hip     3. Gastroesophageal reflux disease, esophagitis presence not specified     4. Coronary artery disease due to lipid rich plaque     5. Chronic obstructive pulmonary disease, unspecified COPD type (H)     6. Physical deconditioning     7. Constipation     8. Fall         Plan: If nausea continues we should consider the pancreatitis as her lipase was mildly elevated at 120 and would consider rechecking the lipase.  Her CHF/coronary artery disease appears to be well controlled on the regimen.  She has a slightly widened pulse pressure which is to be expected for her age and her underlying conditions and would not aggressively treat this more.  We need to advance her activity and I will DC the Jamar lift and the PT is engaged with her in her deconditioning therapy.  We will preemptively offer her Zofran prior to eating as this appears to be working well.  We will also periodically look at her chest pain if this does come up again I would consider a nitro patch long-acting.  In the interim however she  appears to be sleeping fine and having normal bowel movements at this juncture I would not make any other further adjustments.  Her COPD also appears to be abated.  There is no edema.  There is a remote history of vitamin B-12 deficiency which is compatible with a macrocytosis.  Because of this I will take the liberty of doing a vitamin B-12 and folate that she is not on these medicines in addition to a repeat lipase to see if this number is trending down.    Total 45 minutes of which 65% was spent in counseling and coordination of care of the above plan.    Electronically signed by: Ponce Dinh MD

## 2021-06-21 NOTE — PROGRESS NOTES
"Code Status:  DNR  Visit Type: Follow Up     Facility:  [facility] [[id number]]          PCP:  Laura Ruiz MD  [phone]       Admission Date to our Facility: 10/21/2018 Discharge Date from our Facility:  11/15/2018    Discharge Diagnosis:    1. Right hip pain     2. Gastroesophageal reflux disease, esophagitis presence not specified     3. Chronic congestive heart failure, unspecified heart failure type (H)     4. Chronic obstructive pulmonary disease, unspecified COPD type (H)     5. Other forms of angina pectoris (H)          History of Present Illness: Aleisha Singh is a 89 y.o. female with a history CAD, CHF, COPD, pancreatitis, GERD with a recent hospitalization for hip pain after a fall at home.  She was discharged and readmitted from 10/19-10/21/2018 for complaints of chest pain that radiated from sternum to across back.  She was worked up for possible ACS and troponins, EKG, and Echo were negative.  During hospitalization there was concern for borderline hypotension with the SBPs in the upper 90s, so lasix was changed to every other day. She also had a high lipase but was improving in the 50s        Skilled Nursing Facility Course:  Here she continued to progress with therapy with increased independence while using a walker.  She continues to need strengthening training on a regular basis.  She had significant pain in her right hip early on in her stay and a f/u Xray was done only showing degenerative changes. She eventually was weaned off oxycodone and is only taking Tylenol prn (mostly at night) for discomfort.  She also had 1 episode of chest pain in which nitroglycerin improved.  A f/u EKG was done and showed a 1st degree block which is not new for her.  Her family had indicated that she often has \"chest pain\" at night due to reflux and has used a GI cocktail in the past.  This was ordered as needed and she did not have any more episodes.  She is also on omeprazole. Pertaining to " her CHF and HTN. Her BPs have been well managed with SBP 100s-130s on Lasix every other day, metoprolol and Imdur. She is on aspirin 81mg.  She does have nitro prn. She had some nausea in the hospital but has not had issues at the TCU so we will dc zofran. For her COPD she will continue on duonebs as she was doing at home.      Today, she denies any pain or discomforts.  She denies any GERD symptoms and is feeling stronger.  She is stable to discharge home with daughter and home care services.     Discharge Medications:  Current Outpatient Medications   Medication Sig Dispense Refill     acetaminophen (TYLENOL) 325 MG tablet Take 2 tablets (650 mg total) by mouth every 4 (four) hours as needed.  0     aluminum-magnesium hydroxide-simethicone (MAALOX ADVANCED) 200-200-20 mg/5 mL Susp Take 30 mL by mouth every 4 (four) hours as needed.       aspirin 81 MG EC tablet Take 81 mg by mouth daily.       bisacodyl (DULCOLAX) 10 mg suppository One supp DE q day prn constipation  0     cholecalciferol, vitamin D3, 1,000 unit tablet Take 2,000 Units by mouth daily.       ferrous sulfate 325 (65 FE) MG tablet Take 1 tablet by mouth daily.       FOLIC ACID/MULTIVIT-MIN/LUTEIN (CENTRUM SILVER ORAL) Take 1 tablet by mouth daily.       furosemide (LASIX) 20 MG tablet Take 1 tablet (20 mg total) by mouth every other day.  0     ipratropium-albuterol (DUO-NEB) 0.5-2.5 mg/3 mL nebulizer Inhale 3 mL 2 (two) times a day.        isosorbide mononitrate (IMDUR) 30 MG 24 hr tablet Take 1 tablet (30 mg total) by mouth daily. 30 tablet 0     loratadine (CLARITIN) 10 mg tablet Take 10 mg by mouth daily.        metoprolol tartrate (LOPRESSOR) 25 MG tablet Take 12.5 mg by mouth 2 (two) times a day.        mirtazapine (REMERON) 7.5 MG tablet Take 7.5 mg by mouth at bedtime.       nitroglycerin (NITROSTAT) 0.4 MG SL tablet Place 0.4 mg under the tongue every 5 (five) minutes as needed for chest pain.       omeprazole (PRILOSEC) 20 MG capsule Take  20 mg by mouth daily before breakfast.       ondansetron (ZOFRAN) 4 MG tablet Take 4 mg by mouth every 6 (six) hours as needed for nausea.       oxyCODONE-acetaminophen (PERCOCET/ENDOCET) 5-325 mg per tablet Take 0.5-1 tablets by mouth every 4 (four) hours as needed for pain. 0.5 tab if pain 4-6/10, or 1 tab if pain is 7-10/10 20 tablet 0     polyethylene glycol (MIRALAX) 17 gram packet Take 17 g by mouth daily.       senna-docusate (PERICOLACE) 8.6-50 mg tablet Take 1 tablet by mouth 2 (two) times a day. HOLD IF LOOSE STOOLS OR MORE THAN 3 BM/DAY  0     viscous lidocaine HC (LIDOCAINE) 2 % Soln viscous solution 15 mL. Prn, don't exceed 1 dose in 24 hour period.       white petrolatum-mineral oil (REFRESH LACRI-LUBE) 56.8-42.5 % Oint Administer 1 application to the right eye 2 (two) times a day.        No current facility-administered medications for this visit.        For most current and accurate medication list, please contact the skilled nursing facility that this patient visit took place at.      Discharge Plan: Discharge to home with daughter and home care services, RN, PT, OT    Review of Systems   Patient denies fever, chills, headache, lightheadedness, dizziness, rhinorrhea, cough, congestion, shortness of breath, chest pain, palpitations, abdominal pain, n/v, diarrhea, constipation, change in appetite, dysuria, frequency, burning or pain with urination.  Otherwise review of systems are negative.         Physical Exam   Vital signs: /78, HR 81, resp 18, temp 97.1 97% on RA  GENERAL APPEARANCE: pleasant elderly woman in no acute distress.  HEENT: normocephalic, atraumatic  PERRL, sclerae anicteric, conjunctivae pink and moist, EOM intact  NECK: Supple and symmetric. Trachea is midline, no thyromegaly, no adenopathy, and no tenderness  LUNGS: Lung sounds CTA, no adventitious sounds, respiratory effort normal.  CARD: RRR, S1, S2, without murmurs, gallops, rubs, no JVD, peripheral pulses 2+ and  symmetric  ABD: Soft and nontender with normal bowel sounds.   MSK: Muscle strength and tone were normal.  EXTREMITIES: No cyanosis, clubbing or edema.  NEURO: Alert and oriented x 3, has some cognitive impairment.  Normal affect.   SKIN: Inspection of the skin reveals no rashes, ulcerations or petechiae.  PSYCH: euthymic          Labs: No results found for this or any previous visit (from the past 240 hour(s)).    Assessment:  1. Right hip pain     2. Gastroesophageal reflux disease, esophagitis presence not specified     3. Chronic congestive heart failure, unspecified heart failure type (H)     4. Chronic obstructive pulmonary disease, unspecified COPD type (H)     5. Other forms of angina pectoris (H)             DISCHARGE PLAN/FACE TO FACE:  I certify that services are/were furnished while this patient was under the care of a physician and that a physician or an allowed non-physician practitioner (NPP), had a face-to-face encounter that meets the physician face-to-face encounter requirements. The encounter was in whole, or in part, related to the primary reason for home health. The patient is confined to his/her home and needs intermittent skilled nursing, physical therapy, speech-language pathology, or the continued need for occupational therapy. A plan of care has been established by a physician and is periodically reviewed by a physician.    I certify that this patient is under my care and that I, or a nurse practitioner or physician's assistant working with me, had a face-to-face encounter that meets the physician face-to-face encounter requirements with this patient.   Date of Face-to-Face Encounter: 11/14/2018    I certify that, based on my findings, the following services are medically necessary home health services: Nursing, PT, OT, HHA    My clinical findings support the need for the above skilled services because: (Please write a brief narrative summary that describes what the RN, PT, SLP, or other  services will be doing in the home. A list of diagnoses in this section does not meet the CMS requirements.) Continues to need ongoing intermittent therapy for continue strengthening and home safety.  Needs nursing for medication management and symptom education.     This patient is homebound because: (Please write a brief narrative summary describing the functional limitations as to why this patient is homebound and specifically what makes this patient homebound.) She does require increased effort to make it into the community on a regular basis for medical and therapy care.     The patient is, or has been, under my care and I have initiated the establishment of the plan of care. This patient will be followed by a physician who will periodically review the plan of care.    I, Kelley Hernandez CNP,saw and examined the patient and case discussed with Ponce Dinh MD.   Chart reviewed, discussed and examined with Kelley Hernandez NP I agree with the assessment and plan    45 minutes total time of which 65% was in face to face communication with patient about above plan of care.    Electronically signed by: Kelley Hernandez CNP

## 2021-06-21 NOTE — PROGRESS NOTES
"[facility]    [facility][id number][facility]   Code Status: DNR      CHIEF COMPLAINT/REASON FOR VISIT:  Chief Complaint   Patient presents with     Follow Up     GERD, hip pain, constipation, weakness,dizziness       HISTORY:      HPI: Aleisha is a 89 y.o. female with a history CAD, CHF, COPD, pancreatitis, GERD with a recent hospitalization for hip pain after a fall at home.  She was discharged and readmitted from 10/19-10/21/2018 for complaints of chest pain that radiated from sternum to across back.  She was worked up for possible ACS and troponins, EKG, and Echo were negative.  During hospitalization there was concern for borderline hypotension with the SBPs in the upper 90s, so lasix was changed to every other day. She is admitted to the TCU for ongoing rehabilitation due to deconditioning related to recent fall and illness.     Today I am seeing the patient for a first time follow up.  Pertaining to her blood pressures she is maintaining stability with SBPs in low 100s. She is taking metoprolol 25mg two times a day and still taking the lasix 20mg every other day.  Heart rate averages 70-80s. She does not exhibiting any symptoms of fluid overload.  She does report some dizziness upon rising from bed.  She also complains of some right sided hip pain.  She does have normal ROM and reports that \"pain medication\" helps.  She has an order for tylenol and Oxycodone/acetaminophen prn for pain. Requested the nurse to give her some Tylenol at this visit.  Pertaining to her GERD; she denies any issues with reflux, chest pain or swallowing issues. She is on omeprazole daily and zofran for any nausea.  She reports she has not had a bowel movement in 3 or more days. Upon exam her abdomen is soft and non-tender and BS are present.  She is taking pericolace daily and has suppositories prn.     Past Medical History:   Diagnosis Date     Acute pancreatitis      Arthritis      ASHD " (arteriosclerotic heart disease)     80% mid LAD, 70% mid distal LAD, 90% L Cx     B12 deficiency      Bilateral cataracts     History -surgery completed     CAD S/P percutaneous coronary angioplasty 04/2015    RCA, diagonal     CHF (congestive heart failure) (H)      COPD (chronic obstructive pulmonary disease) (H)      Depression      Dermatitis     Left back-itchy rash     Diverticulitis      GERD (gastroesophageal reflux disease)      Idiopathic pancreatitis      Left wrist fracture 10/01/2015     Lung nodule      Osteopenia      Pancreas divisum      Retinal detachment      STEMI (ST elevation myocardial infarction) (H) 04/2015             Family History   Problem Relation Age of Onset     Diabetes Mother      Heart disease Father      Heart attack Father      Social History     Social History     Marital status:      Spouse name: N/A     Number of children: N/A     Years of education: N/A     Social History Main Topics     Smoking status: Never Smoker     Smokeless tobacco: Never Used     Alcohol use Yes      Comment: on special occasions/holidays     Drug use: No     Sexual activity: Not Currently     Other Topics Concern     Not on file     Social History Narrative         Review of Systems   Constitutional:        Other than described in HPI patient denies headache, vision/hearing changes, n/v, pain, chest pain, shortness of breath, congestion, fever, chills, heat or cold intolerances, skin issues, urinary issues, diarrhea, numbness in extremities.        .  Vitals:    10/23/18 1258   BP: 103/50   Pulse: 82   Resp: 18   Temp: 97.8  F (36.6  C)   Weight: 103 lb 6.4 oz (46.9 kg)       Physical Exam   Constitutional:   Elderly woman lying in bed in no acute distress.    HENT:   Head: Normocephalic and atraumatic.   Eyes: Conjunctivae are normal. Pupils are equal, round, and reactive to light. Left eye exhibits no discharge. No scleral icterus.   Neck: No thyromegaly present.   Cardiovascular: Normal  rate, regular rhythm and intact distal pulses.    Murmur heard.  Pulmonary/Chest: Effort normal and breath sounds normal. No respiratory distress. She has no wheezes.   Abdominal: Soft. Bowel sounds are normal. She exhibits no distension. There is no tenderness. There is no guarding.   Musculoskeletal: Normal range of motion. She exhibits no edema.   Lymphadenopathy:     She has no cervical adenopathy.   Neurological: She is alert.   Questionable normal cognition. Will continue to monitor.   Skin: Skin is warm and dry.   Psychiatric: She has a normal mood and affect.   Continue to monitor cognition for safety         LABS:   Recent Results (from the past 240 hour(s))   Troponin I   Result Value Ref Range    Troponin I <0.01 0.00 - 0.29 ng/mL   Troponin I   Result Value Ref Range    Troponin I <0.01 0.00 - 0.29 ng/mL   Troponin I   Result Value Ref Range    Troponin I <0.01 0.00 - 0.29 ng/mL   Basic Metabolic Panel   Result Value Ref Range    Sodium 144 136 - 145 mmol/L    Potassium 4.3 3.5 - 5.0 mmol/L    Chloride 105 98 - 107 mmol/L    CO2 25 22 - 31 mmol/L    Anion Gap, Calculation 14 5 - 18 mmol/L    Glucose 126 (H) 70 - 125 mg/dL    Calcium 9.3 8.5 - 10.5 mg/dL    BUN 25 8 - 28 mg/dL    Creatinine 1.34 (H) 0.60 - 1.10 mg/dL    GFR MDRD Af Amer 45 (L) >60 mL/min/1.73m2    GFR MDRD Non Af Amer 37 (L) >60 mL/min/1.73m2   HM2 (CBC W/O DIFF)   Result Value Ref Range    WBC 8.0 4.0 - 11.0 thou/uL    RBC 3.06 (L) 3.80 - 5.40 mill/uL    Hemoglobin 10.8 (L) 12.0 - 16.0 g/dL    Hematocrit 31.3 (L) 35.0 - 47.0 %     (H) 80 - 100 fL    MCH 35.3 (H) 27.0 - 34.0 pg    MCHC 34.5 32.0 - 36.0 g/dL    RDW 12.0 11.0 - 14.5 %    Platelets 181 140 - 440 thou/uL    MPV 10.8 8.5 - 12.5 fL   Basic metabolic panel   Result Value Ref Range    Sodium 143 136 - 145 mmol/L    Potassium 3.7 3.5 - 5.0 mmol/L    Chloride 107 98 - 107 mmol/L    CO2 27 22 - 31 mmol/L    Anion Gap, Calculation 9 5 - 18 mmol/L    Glucose 96 70 - 125 mg/dL     Calcium 9.3 8.5 - 10.5 mg/dL    BUN 25 8 - 28 mg/dL    Creatinine 1.32 (H) 0.60 - 1.10 mg/dL    GFR MDRD Af Amer 46 (L) >60 mL/min/1.73m2    GFR MDRD Non Af Amer 38 (L) >60 mL/min/1.73m2   Basic Metabolic Panel   Result Value Ref Range    Sodium 142 136 - 145 mmol/L    Potassium 3.8 3.5 - 5.0 mmol/L    Chloride 107 98 - 107 mmol/L    CO2 26 22 - 31 mmol/L    Anion Gap, Calculation 9 5 - 18 mmol/L    Glucose 91 70 - 125 mg/dL    Calcium 8.6 8.5 - 10.5 mg/dL    BUN 24 8 - 28 mg/dL    Creatinine 1.32 (H) 0.60 - 1.10 mg/dL    GFR MDRD Af Amer 46 (L) >60 mL/min/1.73m2    GFR MDRD Non Af Amer 38 (L) >60 mL/min/1.73m2   Platelet Count - every other day x 3   Result Value Ref Range    Platelets 155 140 - 440 thou/uL   ECG 12 lead with MUSE   Result Value Ref Range    SYSTOLIC BLOOD PRESSURE  mmHg    DIASTOLIC BLOOD PRESSURE  mmHg    VENTRICULAR RATE 91 BPM    ATRIAL RATE 91 BPM    P-R INTERVAL 188 ms    QRS DURATION 68 ms    Q-T INTERVAL 382 ms    QTC CALCULATION (BEZET) 469 ms    P Axis 90 degrees    R AXIS 56 degrees    T AXIS 120 degrees    MUSE DIAGNOSIS       Sinus rhythm with Premature atrial complexes  Nonspecific ST and T wave abnormality  Abnormal ECG  When compared with ECG of 13-OCT-2018 09:20,  No significant change was found  Confirmed by EVI FAYE, THUAN LOC: ([id number]) on 10/20/2018 2:25:31 PM     HM2 (CBC W/O DIFF)   Result Value Ref Range    WBC 9.2 4.0 - 11.0 thou/uL    RBC 3.29 (L) 3.80 - 5.40 mill/uL    Hemoglobin 11.6 (L) 12.0 - 16.0 g/dL    Hematocrit 33.5 (L) 35.0 - 47.0 %     (H) 80 - 100 fL    MCH 35.3 (H) 27.0 - 34.0 pg    MCHC 34.6 32.0 - 36.0 g/dL    RDW 12.1 11.0 - 14.5 %    Platelets 204 140 - 440 thou/uL    MPV 10.5 8.5 - 12.5 fL   Basic Metabolic Panel   Result Value Ref Range    Sodium 139 136 - 145 mmol/L    Potassium 3.8 3.5 - 5.0 mmol/L    Chloride 104 98 - 107 mmol/L    CO2 19 (L) 22 - 31 mmol/L    Anion Gap, Calculation 16 5 - 18 mmol/L    Glucose 119 70 - 125 mg/dL    Calcium  9.5 8.5 - 10.5 mg/dL    BUN 19 8 - 28 mg/dL    Creatinine 1.14 (H) 0.60 - 1.10 mg/dL    GFR MDRD Af Amer 54 (L) >60 mL/min/1.73m2    GFR MDRD Non Af Amer 44 (L) >60 mL/min/1.73m2   Troponin I   Result Value Ref Range    Troponin I <0.01 0.00 - 0.29 ng/mL   INR   Result Value Ref Range    INR 1.00 0.90 - 1.10   APTT   Result Value Ref Range    PTT 30 24 - 37 seconds   BNP(B-type Natriuretic Peptide)   Result Value Ref Range     (H) 0 - 167 pg/mL   Hepatic Profile   Result Value Ref Range    Bilirubin, Total 0.7 0.0 - 1.0 mg/dL    Bilirubin, Direct 0.2 <=0.5 mg/dL    Protein, Total 7.2 6.0 - 8.0 g/dL    Albumin 3.6 3.5 - 5.0 g/dL    Alkaline Phosphatase 118 45 - 120 U/L    AST 25 0 - 40 U/L    ALT 14 0 - 45 U/L   Lipase   Result Value Ref Range    Lipase 120 (H) 0 - 52 U/L   Troponin I   Result Value Ref Range    Troponin I <0.01 0.00 - 0.29 ng/mL   Lipase   Result Value Ref Range    Lipase 68 (H) 0 - 52 U/L   Echo Complete   Result Value Ref Range    HR 76 bpm    IVSd 0.855 0.6 - 0.9 cm    LVIDd 3.61 3.8 - 5.2 cm    LVIDs 2.61 2.2 - 3.5 cm    LVOT diam 2 cm    LVOT mean gradient 1 mmHg    LVOT peak VTI 21.7 cm    LVOT mean evelia 55.4 cm/s    LVOT peak evelia 89.4 cm/s    LVOT peak gradient 3 mmHg    LV PWd 0.961 0.6 - 0.9 cm    MV E' lat evelia 6.74 cm/s    MV E' med evelia 5.77 cm/s    AV mean evelia 208 cm/s    AV mean gradient 19 mmHg    AV VTI 59.5 cm    AV peak evelia 296 cm/s    MV decel slope 5140 mm/s2    MV decel time 236 ms    MV P 1/2 time 71 ms    MV peak A evelia 111 cm/s    MV peak E evelia 102 cm/s    MV mean evelia 87.2 cm/s    MV mean gradient 3 mmHg    MV VTI 32.3 cm    MV peak velocityoctiy 128 cm/s    BSA 1.41 m2    Hieght 61 in    Weight 1635.2 lbs    BP 97/55 mmHg    IVS/PW ratio 0.9     LV FS 27.7 28 - 44 %    LA volume 42.8 mL    LV mass 94.4 g    AV area 1.1 cm2    AV DIM IND evelia 0.3     MV area p 1/2 time 3.1 cm2    MV area cont eq 2.1 cm2    MV E/A Ratio 0.9     LVOT area 3.14 cm2    LVOT SV 68.1 cm3    AV  peak gradient 35.0 mmHg    MV peak gradient 6.6 mmHg    LA volume index 30.4 mL/m2    LV mass index 66.9 g/m2    LV SVi 48.3 ml/m2    TAPSE 1.9 cm    MV med E/e' ratio 17.7     MV lat E/e' ratio 15.1     LV CO 5.2 l/min    LV Ci 3.7 l/min/m2    LA area 2 16.8 cm2    LA area 1 17.1 cm2    Height 61.0 in    Weight 102 lbs    MV Avg E/e' Ratio 16.3 cm/s    LA length 5.7 cm    AV DIM IND VTI 0.4     MVA VTI 2.11 cm2    Echo LVEF Estimated 55 %   Troponin I   Result Value Ref Range    Troponin I 0.01 0.00 - 0.29 ng/mL       ASSESSMENT:      ICD-10-CM    1. Gastroesophageal reflux disease, esophagitis presence not specified K21.9    2. Coronary artery disease due to lipid rich plaque I25.10     I25.83    3. Chronic obstructive pulmonary disease, unspecified COPD type (H) J44.9    4. Right hip pain M25.551    5. Physical deconditioning R53.81    6. Other constipation K59.09        PLAN:    GERD: improving on PPI, continue omeprazole.  Zofran as needed  CAD: BPs lower; will decreased Metoprolol to 12.5mg two times a day  COPD: continue on neb treatments   Hip pain: continue on prn medications; monitor pain and med use in order to dc opiates asap  Physical deconditioning: continue with therapy recommendations  Constipation: add Miralax daily prn.  Monitor bowel habits.     Kelley MERCADO CNP, am scribing for and in the presence of Mckenzie Greer CNP.    IMckenzie CNP, personally performed the services described in this documentation, as scribed by Kelley Hernandez CNP in my presence, and it is both accurate and complete.     Electronically signed by: Kelley Hernandez CNP   Electronically signed by Mckenzie Greer CNP

## 2021-06-21 NOTE — PROGRESS NOTES
"[facility]    [facility] [[id number][facility]   Code Status: DNR/DNI      CHIEF COMPLAINT/REASON FOR VISIT:  Chief Complaint   Patient presents with     Problem Visit       HISTORY:      HPI: Aleisha is a 89 y.o. female seen today for an acute problem visit. She has a  history CAD, CHF, COPD, pancreatitis, GERD with a recent hospitalization for hip pain after a fall at home.  She was discharged and readmitted from 10/19-10/21/2018 for complaints of chest pain that radiated from sternum to across back.  She was worked up for possible ACS and troponins, EKG, and Echo were negative.  During hospitalization there was concern for borderline hypotension with the SBPs in the upper 90s, so lasix was changed to every other day. She also had a high lipase but was improving with last one at 59. She is admitted to the TCU for ongoing rehabilitation due to deconditioning related to recent fall and illness.     Requested to see patient by nursing due to events of chest pain last night.  Patient states the pain was along the bra line wrapping around to the back.  She denies shortness of breath, headaches, jaw pain, extremity numbness or n/v with this pain and nursing reports giving her a nitro with some resolution.  Family told nursing that the patient was taking a \"gi cocktail\" at home for reflux.  Also, reviewed the hip xray (due to increased right hip pain yesterday) stating right hip hardware in place without complications, shows degenerative changes and no acute fx or dislocation.  Aleisha reports increased pain with therapy.  Today, she ambulated 200 feet by herself.      Past Medical History:   Diagnosis Date     Acute pancreatitis      Arthritis      ASHD (arteriosclerotic heart disease)     80% mid LAD, 70% mid distal LAD, 90% L Cx     B12 deficiency      Bilateral cataracts     History -surgery completed     CAD S/P percutaneous coronary angioplasty 04/2015    RCA, diagonal     CHF " (congestive heart failure) (H)      COPD (chronic obstructive pulmonary disease) (H)      Depression      Dermatitis     Left back-itchy rash     Diverticulitis      GERD (gastroesophageal reflux disease)      Idiopathic pancreatitis      Left wrist fracture 10/01/2015     Lung nodule      Osteopenia      Pancreas divisum      Retinal detachment      STEMI (ST elevation myocardial infarction) (H) 04/2015             Family History   Problem Relation Age of Onset     Diabetes Mother      Heart disease Father      Heart attack Father      Social History     Social History     Marital status:      Spouse name: N/A     Number of children: N/A     Years of education: N/A     Social History Main Topics     Smoking status: Never Smoker     Smokeless tobacco: Never Used     Alcohol use Yes      Comment: on special occasions/holidays     Drug use: No     Sexual activity: Not Currently     Other Topics Concern     Not on file     Social History Narrative         Review of Systems    Patient denies fever, chills, headache, lightheadedness, dizziness, rhinorrhea, cough, congestion, shortness of breath, chest pain, palpitations, abdominal pain, n/v, diarrhea, constipation, change in appetite, dysuria, frequency, burning or pain with urination.  Otherwise review of systems are negative.       Physical Exam   Vital signs: /72 (150/77 last night during episode), HR 93, 98.0 temp, 16 resp,   GENERAL APPEARANCE: Well developed, well nourished, in no acute distress.  HEENT: normocephalic, atraumatic  PERRL, sclerae anicteric, conjunctivae pink and moist, EOM intact  External inspection of ears and nose showed no scars, lesions or masses.  Lips, teeth, and gums showed normal mucosa. Throat showed no erythema or edema.  NECK: Supple and symmetric. Trachea is midline, no thyromegaly, no adenopathy, and no tenderness  LUNGS: Lung sounds CTA, no adventitious sounds, respiratory effort normal.  CARD: RRR, S1, S2, without  murmurs, gallops, rubs, no JVD, peripheral pulses 2+ and symmetric  ABD: Soft and nontender with normal bowel sounds.   MSK: Muscle strength and tone were normal.  EXTREMITIES: No cyanosis, clubbing or edema.  NEURO: Alert and oriented x 3. Normal affect. Sensation to touch was normal. Face is symmetric.  SKIN: Inspection of the skin reveals no rashes, ulcerations or petechiae.  PSYCH: euthymic          LABS:   Recent Results (from the past 240 hour(s))   Folate, Serum   Result Value Ref Range    Folate 15.5 >=3.5 ng/mL   Lipase   Result Value Ref Range    Lipase 59 (H) 0 - 52 U/L   Vitamin B12   Result Value Ref Range    Vitamin B-12 875 (H) 213 - 816 pg/mL       ASSESSMENT:      ICD-10-CM    1. Chest pain R07.9    2. Gastroesophageal reflux disease, esophagitis presence not specified K21.9    3. Right hip pain M25.551    4. Failure to thrive in adult R62.7        PLAN:   Chest pain: EKG, add Maalox and viscous lidocaine  Assess BP ongoing, starting to increase (Metoprolol was decreased last week)  Right hip pain due to increase due to increased mobility : reviewed hip xray and no overt fracture noted.  No new changes.  Continue pain meds   Encouraged the patient to be sure to eat meals and drink plenty of fluids      Kelley MERCADO CNP,saw and examined the patient and case discussed with Ponce Dinh MD.  IPonce MD, I examined the patient with Kelley Hernandez and discussed and agree with the medical care given.  She did not discussed seen and chart reviewed with Kelley Higginbotham and I agree with her assessment and plan Ponce Dinh MD  25 minutes spent of which 55% was in face-to-face communication with the patient and the staff about the above plan of care  Electronically signed by: Kelley Hernandez CNP

## 2021-06-23 NOTE — ANESTHESIA POSTPROCEDURE EVALUATION
Patient: Aleisha Singh  CHOLECYSTECTOMY, LAPAROSCOPIC  Anesthesia type: general    Patient location: PACU  Last vitals:   Vitals:    01/22/19 2310   BP: 167/80   Pulse: 76   Resp: 21   Temp:    SpO2: 99%     Post vital signs: stable  Level of consciousness: awake and responds to simple questions  Post-anesthesia pain: pain controlled  Post-anesthesia nausea and vomiting: no  Pulmonary: unassisted, return to baseline  Cardiovascular: stable and blood pressure at baseline  Hydration: adequate  Anesthetic events: no    QCDR Measures:  ASA# 11 - Drea-op Cardiac Arrest: ASA11B - Patient did NOT experience unanticipated cardiac arrest  ASA# 12 - Drea-op Mortality Rate: ASA12B - Patient did NOT die  ASA# 13 - PACU Re-Intubation Rate: ASA13B - Patient did NOT require a new airway mgmt  ASA# 10 - Composite Anes Safety: ASA10A - No serious adverse event    Additional Notes:

## 2021-06-23 NOTE — ANESTHESIA CARE TRANSFER NOTE
Last vitals:   Vitals:    01/22/19 2303   BP: 179/81   Pulse: 73   Resp: 12   Temp: 37.6  C (99.7  F)   SpO2: 99%     Patient spontaneous RR, TV 200s, suctioned, following commands, extubated to facemask 10LPM, O2 sats 100%. VSS. Report to RN.    Patient's level of consciousness is drowsy  Spontaneous respirations: yes  Maintains airway independently: yes  Dentition unchanged: yes  Oropharynx: oropharynx clear of all foreign objects    QCDR Measures:  ASA# 20 - Surgical Safety Checklist: WHO surgical safety checklist completed prior to induction    PQRS# 430 - Adult PONV Prevention: 4558F - Pt received => 2 anti-emetic agents (different classes) preop & intraop  ASA# 8 - Peds PONV Prevention: NA - Not pediatric patient, not GA or 2 or more risk factors NOT present  PQRS# 424 - Drea-op Temp Management: 4559F - At least one body temp DOCUMENTED => 35.5C or 95.9F within required timeframe  PQRS# 426 - PACU Transfer Protocol: - Transfer of care checklist used  ASA# 14 - Acute Post-op Pain: ASA14B - Patient did NOT experience pain >= 7 out of 10

## 2021-06-23 NOTE — ANESTHESIA PREPROCEDURE EVALUATION
Anesthesia Evaluation      Patient summary reviewed     Airway   Mallampati: III  Neck ROM: full   Pulmonary     breath sounds clear to auscultation  (+) COPD (Not on O2 at home),                          Cardiovascular   (+) valvular problems/murmurs (Mild) AS, past MI, CAD, CABG/stent (Stent), CHF (Diastolic HF), ,     Rhythm: regular   murmur      Neuro/Psych    (+) depression,     Endo/Other    (+) arthritis,      GI/Hepatic/Renal    (+) GERD,       Comments: Recurrent pancreatitis     Other findings:     NPO > 24 hrs for solid     NM Stress Test 1/22/19:    Conclusion       The pharmacologic nuclear stress test is negative for inducible myocardial ischemia or infarction.    The left ventricular ejection fraction is greater than 70%.    There is no prior study available.    The findings of this examination were communicated to Dr. Jean-Paul Mc.       Results for TERENCE DEGROOT (MRN [id number]) as of 1/22/2019 1/22/2019 06:09  Sodium: 135 (L)  Potassium: 3.8  Chloride: 102  CO2: 25  Anion Gap, Calculation: 8  BUN: 19  Creatinine: 1.04  GFR MDRD Af Amer: 60 (L)  GFR MDRD Non Af Amer: 49 (L)  Calcium: 8.6  AST: 15  ALT: <9  ALBUMIN: 2.7 (L)  Protein, Total: 5.8 (L)  Alkaline Phosphatase: 84  Bilirubin, Total: 1.1 (H)  Troponin I: 0.02  Glucose: 95  WBC: 9.7  RBC: 2.96 (L)  Hemoglobin: 10.2 (L)  Hematocrit: 29.4 (L)  MCV: 99  MCH: 34.5 (H)  MCHC: 34.7  RDW: 11.9  Platelets: 210  MPV: 10.1          Dental    (+) poor dentition and lower dentures                       Anesthesia Plan  Planned anesthetic: general endotracheal and total IV anesthesia      Esmolol  Zofran, decadron  TIVA  Ketamine  Werner gtt prn    ASA 4 - emergent   Induction: intravenous   Anesthetic plan and risks discussed with: patient and child/children  Anesthesia plan special considerations: antiemetics,   Post-op plan: routine recovery  DNR/DNI status   DNR/DNI status discussed with patient.  Plan is for suspension of DNR

## 2021-07-25 ENCOUNTER — HOSPITAL ENCOUNTER (EMERGENCY)
Facility: HOSPITAL | Age: 86
Discharge: HOME | End: 2021-07-25
Attending: EMERGENCY MEDICINE | Admitting: EMERGENCY MEDICINE
Payer: COMMERCIAL

## 2021-07-25 ENCOUNTER — APPOINTMENT (OUTPATIENT)
Dept: CT IMAGING | Facility: HOSPITAL | Age: 86
End: 2021-07-25
Attending: EMERGENCY MEDICINE
Payer: COMMERCIAL

## 2021-07-25 ENCOUNTER — APPOINTMENT (OUTPATIENT)
Dept: MRI IMAGING | Facility: HOSPITAL | Age: 86
End: 2021-07-25
Attending: EMERGENCY MEDICINE
Payer: COMMERCIAL

## 2021-07-25 VITALS
SYSTOLIC BLOOD PRESSURE: 146 MMHG | TEMPERATURE: 98 F | DIASTOLIC BLOOD PRESSURE: 68 MMHG | RESPIRATION RATE: 18 BRPM | WEIGHT: 100 LBS | HEART RATE: 74 BPM | OXYGEN SATURATION: 94 % | BODY MASS INDEX: 18.89 KG/M2

## 2021-07-25 DIAGNOSIS — M48.061 SPINAL STENOSIS OF LUMBAR REGION, UNSPECIFIED WHETHER NEUROGENIC CLAUDICATION PRESENT: ICD-10-CM

## 2021-07-25 DIAGNOSIS — S32.110A CLOSED NONDISPLACED ZONE I FRACTURE OF SACRUM, INITIAL ENCOUNTER (H): ICD-10-CM

## 2021-07-25 DIAGNOSIS — S32.131A: ICD-10-CM

## 2021-07-25 LAB
ANION GAP SERPL CALCULATED.3IONS-SCNC: 10 MMOL/L (ref 5–18)
BUN SERPL-MCNC: 17 MG/DL (ref 8–28)
CALCIUM SERPL-MCNC: 8.9 MG/DL (ref 8.5–10.5)
CHLORIDE BLD-SCNC: 108 MMOL/L (ref 98–107)
CO2 SERPL-SCNC: 24 MMOL/L (ref 22–31)
CREAT SERPL-MCNC: 1.19 MG/DL (ref 0.6–1.1)
ERYTHROCYTE [DISTWIDTH] IN BLOOD BY AUTOMATED COUNT: 11.9 % (ref 10–15)
GFR SERPL CREATININE-BSD FRML MDRD: 39 ML/MIN/1.73M2
GLUCOSE BLD-MCNC: 101 MG/DL (ref 70–125)
HCT VFR BLD AUTO: 29.9 % (ref 35–47)
HGB BLD-MCNC: 10.1 G/DL (ref 11.7–15.7)
INR PPP: 1.07 (ref 0.9–1.15)
MCH RBC QN AUTO: 33.9 PG (ref 26.5–33)
MCHC RBC AUTO-ENTMCNC: 33.8 G/DL (ref 31.5–36.5)
MCV RBC AUTO: 100 FL (ref 78–100)
PLATELET # BLD AUTO: 166 10E3/UL (ref 150–450)
POTASSIUM BLD-SCNC: 3.7 MMOL/L (ref 3.5–5)
RBC # BLD AUTO: 2.98 10E6/UL (ref 3.8–5.2)
SODIUM SERPL-SCNC: 142 MMOL/L (ref 136–145)
WBC # BLD AUTO: 7.4 10E3/UL (ref 4–11)

## 2021-07-25 PROCEDURE — 85014 HEMATOCRIT: CPT | Performed by: EMERGENCY MEDICINE

## 2021-07-25 PROCEDURE — 250N000011 HC RX IP 250 OP 636: Performed by: EMERGENCY MEDICINE

## 2021-07-25 PROCEDURE — 36415 COLL VENOUS BLD VENIPUNCTURE: CPT | Performed by: EMERGENCY MEDICINE

## 2021-07-25 PROCEDURE — 99285 EMERGENCY DEPT VISIT HI MDM: CPT | Mod: 25

## 2021-07-25 PROCEDURE — 250N000013 HC RX MED GY IP 250 OP 250 PS 637: Performed by: EMERGENCY MEDICINE

## 2021-07-25 PROCEDURE — 72192 CT PELVIS W/O DYE: CPT

## 2021-07-25 PROCEDURE — 85610 PROTHROMBIN TIME: CPT | Performed by: EMERGENCY MEDICINE

## 2021-07-25 PROCEDURE — 96374 THER/PROPH/DIAG INJ IV PUSH: CPT

## 2021-07-25 PROCEDURE — 72148 MRI LUMBAR SPINE W/O DYE: CPT

## 2021-07-25 PROCEDURE — 80048 BASIC METABOLIC PNL TOTAL CA: CPT | Performed by: EMERGENCY MEDICINE

## 2021-07-25 RX ORDER — LIDOCAINE 4 G/G
1 PATCH TOPICAL
Status: DISCONTINUED | OUTPATIENT
Start: 2021-07-25 | End: 2021-07-26 | Stop reason: HOSPADM

## 2021-07-25 RX ORDER — FENTANYL CITRATE 50 UG/ML
12.5 INJECTION, SOLUTION INTRAMUSCULAR; INTRAVENOUS ONCE
Status: COMPLETED | OUTPATIENT
Start: 2021-07-25 | End: 2021-07-25

## 2021-07-25 RX ADMIN — LIDOCAINE 1 PATCH: 246 PATCH TOPICAL at 21:31

## 2021-07-25 RX ADMIN — FENTANYL CITRATE 12.5 MCG: 50 INJECTION, SOLUTION INTRAMUSCULAR; INTRAVENOUS at 19:22

## 2021-07-25 RX ADMIN — ACETAMINOPHEN AND CODEINE PHOSPHATE 1 HALF-TAB: 300; 30 TABLET ORAL at 23:05

## 2021-07-25 NOTE — ED TRIAGE NOTES
Patient brought in by EMS for complaint of back pain since last Wednesday after a fall. She has been seen by [facility] for this pain and she had scans no fractures were identified. Since Wednesday the pain has gotten progressively worse.  Pain radiates down both of her legs.

## 2021-07-25 NOTE — ED PROVIDER NOTES
Emergency Department Encounter     Evaluation Date & Time:   No admission date for patient encounter.    CHIEF COMPLAINT:  Back Pain      Triage Note:Patient brought in by EMS for complaint of back pain since last Wednesday after a fall. She has been seen by [facility] for this pain and she had scans no fractures were identified. Since Wednesday the pain has gotten progressively worse.  Pain radiates down both of her legs.         Impression and Plan       FINAL IMPRESSION:    ICD-10-CM    1. Spinal stenosis of lumbar region, unspecified whether neurogenic claudication present  M48.061    2. Closed nondisplaced zone I fracture of sacrum, initial encounter (H)  S32.110A    3. Closed minimally displaced zone III fracture of sacrum, initial encounter (H)  S32.131A          ED COURSE & MEDICAL DECISION MAKIN:55 PM I met with patient for initial interview and exam. PPE used includes cap, N95, surgical mask, gloves.  10:01 PM I spoke to Shannan Arriola with neurosurgery about the patient.    89 year old female, history of CAD with STEMI s/p stents x 6, CHF, pancreatitis and COPD, who presents for evaluation of low back pain. She had a mechanical fall 4 days ago and was evaluated by [facility] with x-rays negative for fracture. Since the fall, her back pain has worsened and has started radiating down both legs. The pain is aching in nature and becomes worse and sharp with movement. She denies associated lower extremity weakness / paresthesias. No urinary retention or acute urinary or fecal incontinence. No fevers.     On exam, she has 4+/5 hip flexion RLE (? secondary to pain) vs 5/5 hip flexion LLE.  Strength otherwise 5/5 BL lower extremities (knee flexion / extension, dorsiflexion / plantarflexion ankles and great toes).  Sensation to light touch BL lower extremities grossly intact.    Labs remarkable for no leukocytosis (WBC 7.4) with stable anemia (Hb 10.1).  She has chronic and stable renal  insufficiency (creatinine 1.19 with GFR 39) and no significant electrolyte derangements.    Given radicular nature of pain with slight decreased hip flexion strength LLE compared to RLE, decision made to proceed with MRI imaging.  MRI lumbar spine performed and demonstrated:  1. Severe spinal canal stenosis at L4-L5 with moderate to severe BL foraminal stenoses secondary to severe facet arthropathy with degenerative anterior spondylolisthesis and severe disc degeneration  2. Mild marrow edema signal within the left sacrum raising concern for sacral fracture - consider CT for further evaluation.    I spoke with Shannan Neurosurgery PA, who recommended follow-up in clinic (either [facility] or [facility]). Consider pain management consult for injection. Patient not a good surgical candidate and patient and her daughter denied any interest in surgery given her age.     CT pelvis warmed and demonstrated:  1. Non-displaced left sacral ala zone one fracture  2. Minimally displaced sacral zone three fracture at the S2 level    I offered admission for pain control and PT / OT, however patient prefers to discharged home.  She is feeling better after Tylenol with codeine and a lidocaine patch was applied.    Nothing in history or on exam to suggest significant head, cervical spine, chest or abdominal injury and I do not think emergent imaging studies to evaluate for such are indicated.    Patient discharged home with follow-up [facility].  She was advised to use lidocaine patches OTC and was given a prescription for T #3.  Return instructions provided.  Patient stable throughout ED course.      At the conclusion of the encounter I discussed the results of all the tests and the disposition. The questions were answered. The patient and family acknowledged understanding and was agreeable with the care plan.    MEDICATIONS GIVEN IN THE EMERGENCY DEPARTMENT:  Medications   fentaNYL (PF) (SUBLIMAZE) injection 12.5 mcg (12.5  mcg Intravenous Given 7/25/21 1922)   acetaminophen-codeine 150-15 MG per half-tab 1 half-tab (1 half-tab Oral Given 7/25/21 2305)       NEW PRESCRIPTIONS STARTED AT TODAY'S ED VISIT:  Discharge Medication List as of 7/25/2021 11:30 PM      START taking these medications    Details   acetaminophen-codeine (TYLENOL #3) 300-30 MG tablet Take 1 tablet by mouth every 8 hours as needed for severe pain, Disp-12 tablet, R-0, Local Print             HPI     HPI     Aleisha Singh is a 89 year old female with a pertinent history of CAD with STEMI s/p stents x 6, CHF, pancreatitis and COPD, who presents to this ED by EMS for evaluation of back pain.    On 7/21 (4 days ago), the patient fell around 12:30 AM onto her carpeted floor with resultant back pain. She was seen at [facility] and had X-Rays of her back performed, which were negative for fracture. Since she was seen, her back pain has worsened and has since started radiating down both legs. The pain is aching in nature and occasionally sharp and is worse with moving and sitting upright. No lower extremity weakness or paresthesias. She denies urinary incontinence and urinary retention. She does have occasional fecal incontinence, however this is not acute.     She otherwise has been in her usual state of health and denies chest pain, shortness of breath, abdominal pain, N/V/D, cough, fever or other complaints.    Patient takes a baby aspirin daily. Allergic to morphine and sulfa.    REVIEW OF SYSTEMS:  All other systems reviewed and are negative.      Medical History     Past Medical History:   Diagnosis Date     Acute pancreatitis      Arthritis      ASHD (arteriosclerotic heart disease)      B12 deficiency      Bilateral cataracts      CAD S/P percutaneous coronary angioplasty 04/2015     CHF (congestive heart failure) (H)      COPD (chronic obstructive pulmonary disease) (H)      Depression      Dermatitis      Diverticulitis      GERD (gastroesophageal reflux  disease)      Idiopathic pancreatitis      Left wrist fracture 10/01/2015     Lung nodule      Osteopenia      Pancreas divisum      Retinal detachment      STEMI (ST elevation myocardial infarction) (H) 04/2015       Past Surgical History:   Procedure Laterality Date     ANGIOPLASTY  04/2015    Per patient report 6 cardiac stents placed     APPENDECTOMY       CATARACT EXTRACTION Left      ENDOSCOPIC RETROGRADE CHOLANGIOPANCREATOGRAM  03/2011     HYSTERECTOMY       LAPAROSCOPIC CHOLECYSTECTOMY N/A 1/22/2019    Procedure: CHOLECYSTECTOMY, LAPAROSCOPIC;  Surgeon: Luke Mehta MD;  Location: [facility] OR;  Service: General     ORIF TOE FRACTURE       TOTAL HIP ARTHROPLASTY Right        Family History   Problem Relation Age of Onset     Diabetes Mother      Heart Disease Father      Coronary Artery Disease Father        Social History     Tobacco Use     Smoking status: Never Smoker     Smokeless tobacco: Never Used   Substance Use Topics     Alcohol use: Yes     Comment: Alcoholic Drinks/day: on special occasions/holidays     Drug use: No       acetaminophen-codeine (TYLENOL #3) 300-30 MG tablet  acetaminophen (TYLENOL) 500 MG tablet  aspirin 81 MG EC tablet  carboxymethylcellulose sodium (REFRESH TEARS OPHT)  cholecalciferol, vitamin D3, 1,000 unit tablet  cyanocobalamin 1,000 mcg/mL injection  estradioL (ESTRACE) 0.01 % (0.1 mg/gram) vaginal cream  ferrous sulfate 325 (65 FE) MG tablet  FOLIC ACID/MULTIVIT-MIN/LUTEIN (CENTRUM SILVER ORAL)  furosemide (LASIX) 20 MG tablet  ipratropium-albuterol (DUO-NEB) 0.5-2.5 mg/3 mL nebulizer  isosorbide mononitrate (IMDUR) 30 MG 24 hr tablet  loratadine (CLARITIN) 10 mg tablet  metoprolol tartrate (LOPRESSOR) 25 MG tablet  mirtazapine (REMERON) 7.5 MG tablet  nitroglycerin (NITROSTAT) 0.4 MG SL tablet  omeprazole (PRILOSEC) 20 MG capsule  polyethylene glycol (MIRALAX) 17 gram packet  vit A/vit C/vit E/zinc/copper (PRESERVISION AREDS ORAL)        Physical Exam     First  Vitals:  Patient Vitals for the past 24 hrs:   BP Temp Temp src Pulse Resp SpO2 Weight   07/25/21 2320 -- -- -- 74 -- 94 % --   07/25/21 2310 -- -- -- 76 -- 94 % --   07/25/21 2300 (!) 146/68 -- -- -- -- -- --   07/25/21 2253 -- -- -- 78 -- 93 % --   07/25/21 2126 (!) 192/81 -- -- 92 -- 91 % --   07/25/21 2030 (!) 144/73 -- -- 79 -- 95 % --   07/25/21 2015 (!) 146/69 -- -- 74 -- 95 % --   07/25/21 2000 -- -- -- 76 -- 97 % --   07/25/21 1915 (!) 143/68 98  F (36.7  C) -- 77 18 98 % --   07/25/21 1821 (!) 151/74 98.4  F (36.9  C) Temporal 88 18 94 % 45.4 kg (100 lb)       PHYSICAL EXAM:   Physical Exam    GENERAL: Awake, alert.  In no acute distress.   HEENT: Normocephalic, atraumatic. Pupils equal, round and reactive. Conjunctiva normal.   NECK: No stridor.  PULMONARY: Symmetrical breath sounds without distress.  Lungs clear to auscultation bilaterally without wheezes, rhonchi or rales.  CARDIO: Regular rate and rhythm.  No significant murmur, rub or gallop.  Radial and pedal pulses strong and symmetrical.  ABDOMINAL: Abdomen soft, non-distended and non-tender to palpation.  No CVAT, BL.  BACK:  Back is atraumatic. She has mild to moderate midline tenderness to palpation of the entire lumbar spine; no palpable bony step-offs. No midline tenderness to palpation of the thoracic spine.   EXTREMITIES: No lower extremity swelling or edema.      NEURO: Alert and oriented to person, place and time.  Cranial nerves grossly intact.  No focal motor deficit. Strength 4+/5 hip flexion RLE (? secondary to pain) vs 5/5 hip flexion LLE.  Strength otherwise 5/5 BL lower extremities (knee flexion / extension, dorsiflexion / plantarflexion ankles and great toes).  Sensation to light touch BL lower extremities grossly intact.  PSYCH: Normal mood and affect.  SKIN: No rashes.     Results     LAB:  All pertinent labs reviewed and interpreted  Labs Ordered and Resulted from Time of ED Arrival Up to the Time of Departure from the ED   CBC  WITH PLATELETS - Abnormal; Notable for the following components:       Result Value    RBC Count 2.98 (*)     Hemoglobin 10.1 (*)     Hematocrit 29.9 (*)     MCH 33.9 (*)     All other components within normal limits   BASIC METABOLIC PANEL - Abnormal; Notable for the following components:    Chloride 108 (*)     Creatinine 1.19 (*)     GFR Estimate 39 (*)     All other components within normal limits   INR - Normal   MAY SALINE LOCK IV       RADIOLOGY:  CT Pelvis Bone wo Contrast   Final Result   IMPRESSION:   1.  Profound osteopenia.      2.  Nondisplaced left sacral ala zone 1 fracture.      3.  Minimally displaced sacral zone 3 fracture at the S2 level.      4.  Severe arthritic change of the left hip.      Lumbar spine MRI w/o contrast   Final Result   IMPRESSION:   1.  Severe spinal canal stenosis at L4-L5 with moderate to severe bilateral foraminal stenoses secondary to severe facet arthropathy with degenerative anterior spondylolisthesis and severe disc degeneration. Type I degenerative endplate change at this    level.   2.  Mild spondylosis throughout the remainder of the lumbar spine with mild lateral recess and foraminal stenoses as detailed.   3.  Mild marrow edema signal within the left sacrum raising concern for sacral fracture. Consider CT for further evaluation.          [facility] Documentation       I, Laurita Liu, am serving as a scribe to document services personally performed by Yvette Al MD based on my observation and the provider's statements to me. I, Yvette Al MD attest that Laurita Liu is acting in a scribe capacity, has observed my performance of the services and has documented them in accordance with my direction.    Yvette Al MD  Emergency Medicine  [facility] EMERGENCY DEPARTMENT           Yvette Al MD  07/26/21 1019

## 2021-07-26 NOTE — TREATMENT PLAN
Neurosurgery Treatment Plan:   Called from ER about 89 year old right that presented to ER with worsened complaint of low back pain and bilateral leg pain that radiates to her feet. Described as a throbbing pain. She has a fall last Wednesday and was evaluated at [facility] with lumbar xray at that time ruling out fractures. She states that her pain has since progress in severity and now radiates down bilateral lower extremities. She denies any weakness and has unchanged ambulation other than pain. She has history of intermittent fecal and urinary incontinence for years of which remains unchanged.     Per physician patient is neurologically stable with good strength in all extremities she notes slight left hip flexor weakness but believes that it is secondary to pain.      Images:    MRI lumbar spine reviewed with noted severe spinal stenosis at L4-L5 with anterior spondylolisthesis, mild lumbar spondylosis throughout with mild lateral recess and foraminal stenosis of variant degrees, also noted edema in left sacrum     Plan:   Patient states that she does not want to have surgical intervention and with her progressed age and with surgical intervention requiring a fusion I feel that it is in her best interest to attempt conservative treatment.   Agree with further evaluation of sacrum with CT  Could attempt medrol dospak for acute radicular pain   If patient is admitted with do full consult tomorrow   Please call with questions      Shannan Arriola PA-C  [facility] Neurosurgery  O: [phone]

## 2021-07-26 NOTE — DISCHARGE INSTRUCTIONS
Please follow-up with [facility] this upcoming week; call to arrange appointment.    Return to the ER for worsening symptoms, worsening pain, weakness in either leg, if you are unable to empty your bladder, new urinary or fecal incontinence, fever or other concerns.    Do not take any other products that contain acetaminophen or Tylenol while using the Tylenol with codeine.

## 2021-07-26 NOTE — ED NOTES
Patient was able to ambulate to bathroom with walker and SBA.  Patient ambulated at least 75 feet.

## 2021-07-30 ENCOUNTER — HOSPITAL ENCOUNTER (EMERGENCY)
Facility: HOSPITAL | Age: 86
Discharge: NURSING, REHAB OR LONG-TERM CARE | End: 2021-07-30
Attending: STUDENT IN AN ORGANIZED HEALTH CARE EDUCATION/TRAINING PROGRAM | Admitting: STUDENT IN AN ORGANIZED HEALTH CARE EDUCATION/TRAINING PROGRAM
Payer: COMMERCIAL

## 2021-07-30 VITALS
WEIGHT: 98 LBS | HEIGHT: 60 IN | OXYGEN SATURATION: 94 % | TEMPERATURE: 98 F | RESPIRATION RATE: 18 BRPM | HEART RATE: 80 BPM | DIASTOLIC BLOOD PRESSURE: 60 MMHG | SYSTOLIC BLOOD PRESSURE: 130 MMHG | BODY MASS INDEX: 19.24 KG/M2

## 2021-07-30 DIAGNOSIS — K81.9 CHOLECYSTITIS: ICD-10-CM

## 2021-07-30 DIAGNOSIS — S32.10XA CLOSED FRACTURE OF SACRUM, UNSPECIFIED FRACTURE MORPHOLOGY, INITIAL ENCOUNTER (H): ICD-10-CM

## 2021-07-30 PROCEDURE — 99285 EMERGENCY DEPT VISIT HI MDM: CPT

## 2021-07-30 RX ORDER — ONDANSETRON 2 MG/ML
4 INJECTION INTRAMUSCULAR; INTRAVENOUS ONCE
Status: DISCONTINUED | OUTPATIENT
Start: 2021-07-30 | End: 2021-07-30 | Stop reason: HOSPADM

## 2021-07-30 RX ORDER — MORPHINE SULFATE 2 MG/ML
2 INJECTION, SOLUTION INTRAMUSCULAR; INTRAVENOUS ONCE
Status: DISCONTINUED | OUTPATIENT
Start: 2021-07-30 | End: 2021-07-30 | Stop reason: HOSPADM

## 2021-07-30 ASSESSMENT — MIFFLIN-ST. JEOR: SCORE: 761.03

## 2021-07-30 ASSESSMENT — ACTIVITIES OF DAILY LIVING (ADL): DEPENDENT_IADLS:: LAUNDRY;MEAL PREPARATION;MEDICATION MANAGEMENT;TRANSPORTATION

## 2021-07-30 NOTE — ED NOTES
Bed: JNED-10  Expected date: 7/30/21  Expected time:   Means of arrival: Ambulance  Comments:  89 F  [place]  Sacral pain

## 2021-07-30 NOTE — PROGRESS NOTES
Care Management Note:    Dr. Duffy requesting TCU if possible for patient. Patient had been in ED 5 days ago, has sacaral fracture and now pain control. Offered TCU but daughter took her home, now back needing more help.     Referrals sent to TCUs: [facility], [facility] and [facility] .    Sarah May RN, CM, BAR    1130: Bhavna from [facility] is checking to see if they can take patient today.     1330: Bhavna from [facility] confirmed availability at . Let Dr. Duffy know, staff RN Jhony, daughter and patient. PAS completed.   SNF discharge orders faxed to care center.     Sarah May RN

## 2021-07-30 NOTE — ED PROVIDER NOTES
Received a phone call from staff member at the patient's TCU and they were unable or did not obtain a prescription for her Tylenol 3.  I was able to call into the patient's preferred pharmacy, [facility] and provided verbal order to the pharmacy to fill the prescription of Tylenol 3.     Raymond Guzman MD  07/30/21 1495

## 2021-07-30 NOTE — ED NOTES
Neuro: Oriented x3 confusion to time place  IV/drain: none  VSS: VSS on RA  Resp: Clear    Cardio: WDL.   GI/: on puerwick  Pain/Discomfort: Pt to sacrum with movement, did not want painmeds

## 2021-07-30 NOTE — ED TRIAGE NOTES
Presents to ED via [facility] [place] from own home. Daughter has been caring for patient after a fall and sacral break. Unable to care for her anymore. Pt did not have daily meds today but took tylenol #3 x1 at 0915 and has lidocaine patch on

## 2021-07-30 NOTE — ED PROVIDER NOTES
Emergency Department Encounter         FINAL IMPRESSION:  Sacral fracture        ED COURSE AND MEDICAL DECISION MAKING       ED Course as of Jul 30 1110   Fri Jul 30, 2021   1109 Patient is a 89-year-old female here with her daughter with concerns of back pain.  She was seen 5 days ago and diagnosed with a sacral fracture.  Patient had an over the phone consultation with Ortho and neurosurgery both of which recommending pain control and outpatient follow-up.  They were offered admission at that time for PT OT and pain medication however the daughter declined stating that she could take care of patient at home however today daughter stated that patient could not get out of bed because of the pain.  Arrival patient's history is limited because of her mental status however denies any other pain including chest pain or trouble breathing.  Has a lidocaine patch overlying the area of fracture.  No rash.  No bruising.  No other injuries or falls.  Plan to loop and care manager to help with maybe placement to TCU today.      11:04 AM Met with patient for initial evaluation.            -Care manager was able to place patient at a TCU from the ER.  Discharge SNF orders placed.  Patient declines any pain medication here.  No other complaints.    -                        At the conclusion of the encounter I discussed the results of all the tests and the disposition. The questions were answered. The patient or family acknowledged understanding and was agreeable with the care plan.                  MEDICATIONS GIVEN IN THE EMERGENCY DEPARTMENT:  Medications - No data to display    NEW PRESCRIPTIONS STARTED AT TODAY'S ED VISIT:  New Prescriptions    No medications on file       HPI     Patient information obtained from: patient and daughter    Use of Interpretor: N/A     Aleisha Singh is a 89 year old female with a pertinent history of essential hypertension, STEMI, cholecystitis, coronary artery disease, and chronic congestive  heart failure who presents to this ED via car for evaluation of broken sacrum.     Per chart review, the patient was seen in the Emergency Department on 7/25/2021. She was seen to evaluate back pain that had started 7/21/2021 after a fall. The pain radiated down both of her legs, but had no lower extremity weakness or urinary incontinence. MRI lumber spine was performed and demonstrated severe spinal canal stenosis at L4-L5 with moderate to severe BL foraminal stenoses secondary to severe facet arthropathy with degenerative anterior spondylolisthesis and severe disc degeneration. It also demonstrated mild marrow edema signal within the left sacrum. CT pelvis was performed and demonstrated non-displaced left sacral ala zone one fracture and minimally displaced sacral zone three fracture at the S2 level. The patient was offered admission but chose to be discharged home with follow-up at [facility].     History is limited due to acuity of condition.    The patient's pain has been increasing and this morning her daughter was unable to get her out of bed to go the bathroom. She has been using the lidocaine patch and taking acetaminophen, last taken at 9:15 AM.     REVIEW OF SYSTEMS:  Review of Systems   Constitutional: Negative for fever, malaise  HEENT: Negative runny nose, sore throat, ear pain, neck pain  Respiratory: Negative for shortness of breath, cough, congestion  Cardiovascular: Negative for chest pain, leg edema  Gastrointestinal: Negative for abdominal distention, abdominal pain, constipation, vomiting, nausea, diarrhea  Genitourinary: Negative for dysuria and hematuria.   Integument: Negative for rash, skin breakdown  Neurological: Negative for paresthesias, weakness, headache.  Musculoskeletal: Negative for joint pain, joint swelling      All other systems reviewed and are negative.          MEDICAL HISTORY     Past Medical History:   Diagnosis Date     Acute pancreatitis      Arthritis      ASHD  (arteriosclerotic heart disease)      B12 deficiency      Bilateral cataracts      CAD S/P percutaneous coronary angioplasty 04/2015     CHF (congestive heart failure) (H)      COPD (chronic obstructive pulmonary disease) (H)      Depression      Dermatitis      Diverticulitis      GERD (gastroesophageal reflux disease)      Idiopathic pancreatitis      Left wrist fracture 10/01/2015     Lung nodule      Osteopenia      Pancreas divisum      Retinal detachment      STEMI (ST elevation myocardial infarction) (H) 04/2015       Past Surgical History:   Procedure Laterality Date     ANGIOPLASTY  04/2015    Per patient report 6 cardiac stents placed     APPENDECTOMY       CATARACT EXTRACTION Left      ENDOSCOPIC RETROGRADE CHOLANGIOPANCREATOGRAM  03/2011     HYSTERECTOMY       LAPAROSCOPIC CHOLECYSTECTOMY N/A 1/22/2019    Procedure: CHOLECYSTECTOMY, LAPAROSCOPIC;  Surgeon: Luke Mehta MD;  Location: [facility];  Service: General     ORIF TOE FRACTURE       TOTAL HIP ARTHROPLASTY Right        Social History     Tobacco Use     Smoking status: Never Smoker     Smokeless tobacco: Never Used   Substance Use Topics     Alcohol use: Yes     Comment: Alcoholic Drinks/day: on special occasions/holidays     Drug use: No       acetaminophen (TYLENOL) 500 MG tablet  acetaminophen-codeine (TYLENOL #3) 300-30 MG tablet  aspirin 81 MG EC tablet  carboxymethylcellulose sodium (REFRESH TEARS OPHT)  cholecalciferol, vitamin D3, 1,000 unit tablet  cyanocobalamin 1,000 mcg/mL injection  estradioL (ESTRACE) 0.01 % (0.1 mg/gram) vaginal cream  ferrous sulfate 325 (65 FE) MG tablet  FOLIC ACID/MULTIVIT-MIN/LUTEIN (CENTRUM SILVER ORAL)  furosemide (LASIX) 20 MG tablet  ipratropium-albuterol (DUO-NEB) 0.5-2.5 mg/3 mL nebulizer  isosorbide mononitrate (IMDUR) 30 MG 24 hr tablet  loratadine (CLARITIN) 10 mg tablet  metoprolol tartrate (LOPRESSOR) 25 MG tablet  mirtazapine (REMERON) 7.5 MG tablet  nitroglycerin (NITROSTAT) 0.4 MG SL  tablet  omeprazole (PRILOSEC) 20 MG capsule  polyethylene glycol (MIRALAX) 17 gram packet  vit A/vit C/vit E/zinc/copper (PRESERVISION AREDS ORAL)            PHYSICAL EXAM     There were no vitals taken for this visit.      PHYSICAL EXAM:     General: Patient appears well, nontoxic, comfortable  HEENT: Moist mucous membranes, no tongue swelling.  No head trauma.  No midline neck pain.  Cardiovascular: Normal rate, normal rhythm, no extremity edema.  No appreciable murmur.  Respiratory: No signs of respiratory distress, lungs are clear to auscultation bilaterally with no wheezes rhonchi or rales.  Abdominal: Soft, nontender, nondistended, no palpable masses, no guarding, no rebound  Musculoskeletal: Full range of motion of joints, no deformities appreciated.  Palpation of the sacral region.  No rash.  No skin breakdown.  No bruising.  5 out of 5 strength in lower extremities as well as sensation normal  Neurological: Alert and oriented, grossly neurologically intact.  Psychological: Normal affect and mood.  Integument: No rashes appreciated          RESULTS       Labs Ordered and Resulted from Time of ED Arrival Up to the Time of Departure from the ED - No data to display    No orders to display                     PROCEDURES:  Procedures:  Procedures         Solomon Duffy DO  Emergency Medicine  [facility] EMERGENCY DEPARTMENT     Tati, Solomon Martin DO  07/30/21 2403

## 2021-07-31 ENCOUNTER — LAB REQUISITION (OUTPATIENT)
Dept: LAB | Facility: CLINIC | Age: 86
End: 2021-07-31
Payer: COMMERCIAL

## 2021-07-31 DIAGNOSIS — U07.1 COVID-19: ICD-10-CM

## 2021-07-31 PROCEDURE — U0003 INFECTIOUS AGENT DETECTION BY NUCLEIC ACID (DNA OR RNA); SEVERE ACUTE RESPIRATORY SYNDROME CORONAVIRUS 2 (SARS-COV-2) (CORONAVIRUS DISEASE [COVID-19]), AMPLIFIED PROBE TECHNIQUE, MAKING USE OF HIGH THROUGHPUT TECHNOLOGIES AS DESCRIBED BY CMS-2020-01-R: HCPCS | Performed by: FAMILY MEDICINE

## 2021-08-01 LAB — SARS-COV-2 RNA RESP QL NAA+PROBE: NEGATIVE

## 2021-08-02 LAB — SARS-COV-2 RNA RESP QL NAA+PROBE: POSITIVE

## 2021-08-03 PROCEDURE — U0005 INFEC AGEN DETEC AMPLI PROBE: HCPCS | Performed by: FAMILY MEDICINE

## 2021-08-04 ENCOUNTER — LAB REQUISITION (OUTPATIENT)
Dept: LAB | Facility: CLINIC | Age: 86
End: 2021-08-04

## 2021-08-04 ENCOUNTER — TRANSITIONAL CARE UNIT VISIT (OUTPATIENT)
Dept: GERIATRICS | Facility: CLINIC | Age: 86
End: 2021-08-04
Payer: COMMERCIAL

## 2021-08-04 VITALS
DIASTOLIC BLOOD PRESSURE: 60 MMHG | SYSTOLIC BLOOD PRESSURE: 122 MMHG | HEART RATE: 86 BPM | WEIGHT: 96 LBS | BODY MASS INDEX: 18.85 KG/M2 | RESPIRATION RATE: 18 BRPM | HEIGHT: 60 IN | OXYGEN SATURATION: 92 % | TEMPERATURE: 97.6 F

## 2021-08-04 DIAGNOSIS — R29.6 FALLS FREQUENTLY: ICD-10-CM

## 2021-08-04 DIAGNOSIS — U07.1 2019 NOVEL CORONAVIRUS DISEASE (COVID-19): ICD-10-CM

## 2021-08-04 DIAGNOSIS — I20.89 OTHER FORMS OF ANGINA PECTORIS (H): ICD-10-CM

## 2021-08-04 DIAGNOSIS — J44.9 CHRONIC OBSTRUCTIVE PULMONARY DISEASE, UNSPECIFIED COPD TYPE (H): ICD-10-CM

## 2021-08-04 DIAGNOSIS — I50.32 CHRONIC DIASTOLIC CONGESTIVE HEART FAILURE (H): ICD-10-CM

## 2021-08-04 DIAGNOSIS — M48.062 SPINAL STENOSIS OF LUMBAR REGION WITH NEUROGENIC CLAUDICATION: ICD-10-CM

## 2021-08-04 DIAGNOSIS — U07.1 COVID-19: ICD-10-CM

## 2021-08-04 DIAGNOSIS — S32.10XS CLOSED FRACTURE OF SACRUM, UNSPECIFIED PORTION OF SACRUM, SEQUELA: Primary | ICD-10-CM

## 2021-08-04 LAB — SARS-COV-2 RNA RESP QL NAA+PROBE: NEGATIVE

## 2021-08-04 PROCEDURE — 99310 SBSQ NF CARE HIGH MDM 45: CPT | Performed by: NURSE PRACTITIONER

## 2021-08-04 RX ORDER — IPRATROPIUM BROMIDE AND ALBUTEROL 20; 100 UG/1; UG/1
1 SPRAY, METERED RESPIRATORY (INHALATION)
COMMUNITY

## 2021-08-04 ASSESSMENT — MIFFLIN-ST. JEOR: SCORE: 751.95

## 2021-08-04 NOTE — PROGRESS NOTES
Code Status:  FULL CODE  Visit Type: RECHECK     Facility:   [facility] ([facility]) [[id number]]        History of Present Illness:   ER visit 7/25     Aleisha Singh is a 89 year old female with a past medical history for OA, CAD with STEMI s/p stents,pancreatitis, CHF, COPD, depression.  That was recently seen in the ER in the past week for a fall at home with low back pain. She was seen at [facility] and scans did not reveal any fractures.  Pain worsened with radiation down both legs.  MRI was done showing L4-L5 spinal stenosis.  There was left sacral edema and so a CT was done and showed non-displaced left sacral ala zone 1 fracture and minimally displaced sacral 3 fracture at the S2 level.  She was recommended for TCU for rehab and pain management.  She was put on T3 for pain.     Today, she is reports pelvic pain.  Denies fecal incontinence and has some stress urinary incontinence.  Nursing reports that pain is usually worse right away in the AM otherwise is not asking for T3.      Of note: she tested positive for COVID-19 on 7/30 however had a repeat test that was negative.  She has no viral symptoms.  She is on isolation.     Past Medical History:   Diagnosis Date     Acute pancreatitis      Arthritis      ASHD (arteriosclerotic heart disease)     80% mid LAD, 70% mid distal LAD, 90% L Cx     B12 deficiency      Bilateral cataracts     History -surgery completed     CAD S/P percutaneous coronary angioplasty 04/2015    RCA, diagonal     CHF (congestive heart failure) (H)      COPD (chronic obstructive pulmonary disease) (H)      Depression      Dermatitis     Left back-itchy rash     Diverticulitis      GERD (gastroesophageal reflux disease)      Idiopathic pancreatitis      Left wrist fracture 10/01/2015     Lung nodule      Osteopenia      Pancreas divisum      Retinal detachment      STEMI (ST elevation myocardial infarction) (H) 04/2015     Past Surgical History:   Procedure Laterality Date      ANGIOPLASTY  04/2015    Per patient report 6 cardiac stents placed     APPENDECTOMY       CATARACT EXTRACTION Left      ENDOSCOPIC RETROGRADE CHOLANGIOPANCREATOGRAM  03/2011     HYSTERECTOMY       LAPAROSCOPIC CHOLECYSTECTOMY N/A 1/22/2019    Procedure: CHOLECYSTECTOMY, LAPAROSCOPIC;  Surgeon: Luke Mehta MD;  Location: [facility] OR;  Service: General     ORIF TOE FRACTURE       TOTAL HIP ARTHROPLASTY Right      Family History   Problem Relation Age of Onset     Diabetes Mother      Heart Disease Father      Coronary Artery Disease Father      Social History     Socioeconomic History     Marital status:      Spouse name: Not on file     Number of children: Not on file     Years of education: Not on file     Highest education level: Not on file   Occupational History     Not on file   Tobacco Use     Smoking status: Never Smoker     Smokeless tobacco: Never Used   Substance and Sexual Activity     Alcohol use: Yes     Comment: Alcoholic Drinks/day: on special occasions/holidays     Drug use: No     Sexual activity: Not Currently   Other Topics Concern     Not on file   Social History Narrative     Not on file     Social Determinants of Health     Financial Resource Strain:      Difficulty of Paying Living Expenses:    Food Insecurity:      Worried About Running Out of Food in the Last Year:      Ran Out of Food in the Last Year:    Transportation Needs:      Lack of Transportation (Medical):      Lack of Transportation (Non-Medical):    Physical Activity:      Days of Exercise per Week:      Minutes of Exercise per Session:    Stress:      Feeling of Stress :    Social Connections:      Frequency of Communication with Friends and Family:      Frequency of Social Gatherings with Friends and Family:      Attends Yarsani Services:      Active Member of Clubs or Organizations:      Attends Club or Organization Meetings:      Marital Status:    Intimate Partner Violence:      Fear of Current or  Ex-Partner:      Emotionally Abused:      Physically Abused:      Sexually Abused:        Current Outpatient Medications   Medication Sig Dispense Refill     acetaminophen (TYLENOL) 500 MG tablet [ACETAMINOPHEN (TYLENOL) 500 MG TABLET] Take 1-2 tablets (500-1,000 mg total) by mouth every 4 (four) hours as needed.  0     acetaminophen-codeine (TYLENOL #3) 300-30 MG tablet Take 1 tablet by mouth every 8 hours as needed for severe pain 12 tablet 0     aspirin 81 MG EC tablet [ASPIRIN 81 MG EC TABLET] Take 81 mg by mouth daily.       carboxymethylcellulose sodium (REFRESH TEARS OPHT) [CARBOXYMETHYLCELLULOSE SODIUM (REFRESH TEARS OPHT)] Administer 1 application to both eyes 2 (two) times a day.       cholecalciferol, vitamin D3, 1,000 unit tablet [CHOLECALCIFEROL, VITAMIN D3, 1,000 UNIT TABLET] Take 2,000 Units by mouth daily.       cyanocobalamin 1,000 mcg/mL injection [CYANOCOBALAMIN 1,000 MCG/ML INJECTION] Inject 1,000 mcg into the shoulder, thigh, or buttocks every 30 (thirty) days.       estradioL (ESTRACE) 0.01 % (0.1 mg/gram) vaginal cream [ESTRADIOL (ESTRACE) 0.01 % (0.1 MG/GRAM) VAGINAL CREAM] Insert 2 g into the vagina 3 (three) times a week.        ferrous sulfate 325 (65 FE) MG tablet [FERROUS SULFATE 325 (65 FE) MG TABLET] Take 1 tablet by mouth daily.       FOLIC ACID/MULTIVIT-MIN/LUTEIN (CENTRUM SILVER ORAL) [FOLIC ACID/MULTIVIT-MIN/LUTEIN (CENTRUM SILVER ORAL)] Take 1 tablet by mouth daily.       furosemide (LASIX) 20 MG tablet [FUROSEMIDE (LASIX) 20 MG TABLET] Take 10 mg by mouth daily.       ipratropium-albuterol (DUO-NEB) 0.5-2.5 mg/3 mL nebulizer [IPRATROPIUM-ALBUTEROL (DUO-NEB) 0.5-2.5 MG/3 ML NEBULIZER] Inhale 3 mL 2 (two) times a day.        isosorbide mononitrate (IMDUR) 30 MG 24 hr tablet [ISOSORBIDE MONONITRATE (IMDUR) 30 MG 24 HR TABLET] Take 1 tablet (30 mg total) by mouth daily. 30 tablet 0     loratadine (CLARITIN) 10 mg tablet [LORATADINE (CLARITIN) 10 MG TABLET] Take 10 mg by mouth daily.         metoprolol tartrate (LOPRESSOR) 25 MG tablet [METOPROLOL TARTRATE (LOPRESSOR) 25 MG TABLET] Take 12.5 mg by mouth 2 (two) times a day.        mirtazapine (REMERON) 7.5 MG tablet [MIRTAZAPINE (REMERON) 7.5 MG TABLET] Take 7.5 mg by mouth at bedtime.       nitroglycerin (NITROSTAT) 0.4 MG SL tablet [NITROGLYCERIN (NITROSTAT) 0.4 MG SL TABLET] Place 0.4 mg under the tongue every 5 (five) minutes as needed for chest pain.       omeprazole (PRILOSEC) 20 MG capsule [OMEPRAZOLE (PRILOSEC) 20 MG CAPSULE] Take 20 mg by mouth daily before breakfast.       polyethylene glycol (MIRALAX) 17 gram packet [POLYETHYLENE GLYCOL (MIRALAX) 17 GRAM PACKET] Take 17 g by mouth daily as needed.       vit A/vit C/vit E/zinc/copper (PRESERVISION AREDS ORAL) [VIT A/VIT C/VIT E/ZINC/COPPER (PRESERVISION AREDS ORAL)] Take 1 tablet by mouth 2 (two) times a day.       Allergies   Allergen Reactions     Ace Inhibitors Cough     Altace [Ramipril] Unknown     Hydrochlorothiazide Unknown     Morphine Nausea and Vomiting     Penicillins Hives     Simvastatin Unknown     Sulfa (Sulfonamide Antibiotics) [Sulfa Drugs] Unknown     unknown     Trimethoprim      Immunization History   Administered Date(s) Administered     COVID-19ZULMA,Pfizer 03/15/2021, 04/05/2021     Flu, Unspecified 09/15/2015     Influenza (High Dose) 3 valent vaccine 10/14/2018     Medications list and allergies in the facility chart have been reviewed.  Please see facility EMR for most up to date list.       Review of Systems   Patient denies fever, chills, headache, lightheadedness, dizziness, rhinorrhea, cough, congestion, shortness of breath, chest pain, palpitations, abdominal pain, n/v, diarrhea, constipation, change in appetite, change in sleep pattern, dysuria, frequency, burning or pain with urination.  Other than stated in HPI all other review of systems is negative.       Physical Exam   Vital signs:/60   Pulse 86   Temp 97.6  F (36.4  C)   Resp 18   Ht 1.524 m  (5')   Wt 43.5 kg (96 lb)   SpO2 92%   BMI 18.75 kg/m     GENERAL APPEARANCE: frail elderly female, in no acute distress.  HEENT: normocephalic, atraumatic   sclerae anicteric, conjunctivae clear and moist, EOM intact  LUNGS: Lung sounds CTA, no adventitious sounds, respiratory effort normal.  CARD: RRR, S1, S2, without murmurs, gallops, rubs   ABD: Soft, nondistended and nontender with normal bowel sounds.   MSK: Muscle strength and tone were equal bilaterally. Moves all extremities easily and intentionally.   EXTREMITIES: No cyanosis, clubbing or edema.  NEURO: Alert and oriented x 3.Face is symmetric.  SKIN: Inspection of the skin reveals no rashes, ulcerations or petechiae.  PSYCH: euthymic      Labs:    Recent Results (from the past 240 hour(s))   CBC (+ platelets, no diff)    Collection Time: 07/25/21  7:17 PM   Result Value Ref Range    WBC Count 7.4 4.0 - 11.0 10e3/uL    RBC Count 2.98 (L) 3.80 - 5.20 10e6/uL    Hemoglobin 10.1 (L) 11.7 - 15.7 g/dL    Hematocrit 29.9 (L) 35.0 - 47.0 %     78 - 100 fL    MCH 33.9 (H) 26.5 - 33.0 pg    MCHC 33.8 31.5 - 36.5 g/dL    RDW 11.9 10.0 - 15.0 %    Platelet Count 166 150 - 450 10e3/uL   Basic metabolic panel    Collection Time: 07/25/21  7:17 PM   Result Value Ref Range    Sodium 142 136 - 145 mmol/L    Potassium 3.7 3.5 - 5.0 mmol/L    Chloride 108 (H) 98 - 107 mmol/L    Carbon Dioxide (CO2) 24 22 - 31 mmol/L    Anion Gap 10 5 - 18 mmol/L    Urea Nitrogen 17 8 - 28 mg/dL    Creatinine 1.19 (H) 0.60 - 1.10 mg/dL    Calcium 8.9 8.5 - 10.5 mg/dL    Glucose 101 70 - 125 mg/dL    GFR Estimate 39 (L) >60 mL/min/1.73m2   INR    Collection Time: 07/25/21  7:17 PM   Result Value Ref Range    INR 1.07 0.90 - 1.15   SARS-COV2 (COVID-19) Virus RT-PCR    Collection Time: 07/31/21  5:15 PM    Specimen: Nasopharyngeal; Swab   Result Value Ref Range    SARS CoV2 PCR Positive (A) Negative   SARS-COV2 (COVID-19) Virus RT-PCR    Collection Time: 07/31/21  6:45 PM    Specimen:  Nasopharyngeal; Swab   Result Value Ref Range    SARS CoV2 PCR Negative Negative         Assessment/plan:  Closed fracture of sacrum, unspecified portion of sacrum, sequela  Pain needs to be better controlled.  Counseled patient that walking was the best rehab for sacral fx.  Will scheduled T3 at HS x 7 days.  Continue with prn T3 and apap.  Will need to follow up with neurosurgery.     Chronic obstructive pulmonary disease, unspecified COPD type (H)  No exacerbation, uses duonebs BID at home however due to Covid policy neb was changed to combivent inhaler.  Once off isolation than could change back to duonebs.     Chronic diastolic congestive heart failure (H)  Compensated, low dose lasix and metoprolol    Other forms of angina pectoris (H)  No chest pain, on home Imdur    Falls frequently  Physical and occupational therapy.     2019 novel coronavirus disease (COVID-19)  Asymptomatic, has been fully vaccinated.  Isolate and monitor.  Nebs were changed to inhaler due to policy for no aerolized procedures.     Spinal stenosis of lumbar region with neurogenic claudication  Will need follow up with neurosurgery.      35 total minutes spent with 20 minutes spent with patient and nursing in coordination plan of care and counseling regarding pain management and therapy.     Electronically signed by: Kelley Hernandez NP

## 2021-08-04 NOTE — LETTER
8/4/2021        RE: Aleisha Singh  [address]  [place] [address]        Code Status:  FULL CODE  Visit Type: RECHECK     Facility:   [facility] (U) [[id number]]        History of Present Illness:   ER visit 7/25     Aleisha Singh is a 89 year old female with a past medical history for OA, CAD with STEMI s/p stents,pancreatitis, CHF, COPD, depression.  That was recently seen in the ER in the past week for a fall at home with low back pain. She was seen at [facility] and scans did not reveal any fractures.  Pain worsened with radiation down both legs.  MRI was done showing L4-L5 spinal stenosis.  There was left sacral edema and so a CT was done and showed non-displaced left sacral ala zone 1 fracture and minimally displaced sacral 3 fracture at the S2 level.  She was recommended for TCU for rehab and pain management.  She was put on T3 for pain.     Today, she is reports pelvic pain.  Denies fecal incontinence and has some stress urinary incontinence.  Nursing reports that pain is usually worse right away in the AM otherwise is not asking for T3.      Of note: she tested positive for COVID-19 on 7/30 however had a repeat test that was negative.  She has no viral symptoms.  She is on isolation.     Past Medical History:   Diagnosis Date     Acute pancreatitis      Arthritis      ASHD (arteriosclerotic heart disease)     80% mid LAD, 70% mid distal LAD, 90% L Cx     B12 deficiency      Bilateral cataracts     History -surgery completed     CAD S/P percutaneous coronary angioplasty 04/2015    RCA, diagonal     CHF (congestive heart failure) (H)      COPD (chronic obstructive pulmonary disease) (H)      Depression      Dermatitis     Left back-itchy rash     Diverticulitis      GERD (gastroesophageal reflux disease)      Idiopathic pancreatitis      Left wrist fracture 10/01/2015     Lung nodule      Osteopenia      Pancreas divisum      Retinal detachment      STEMI (ST  elevation myocardial infarction) (H) 04/2015     Past Surgical History:   Procedure Laterality Date     ANGIOPLASTY  04/2015    Per patient report 6 cardiac stents placed     APPENDECTOMY       CATARACT EXTRACTION Left      ENDOSCOPIC RETROGRADE CHOLANGIOPANCREATOGRAM  03/2011     HYSTERECTOMY       LAPAROSCOPIC CHOLECYSTECTOMY N/A 1/22/2019    Procedure: CHOLECYSTECTOMY, LAPAROSCOPIC;  Surgeon: Luke Mehta MD;  Location: [facility] OR;  Service: General     ORIF TOE FRACTURE       TOTAL HIP ARTHROPLASTY Right      Family History   Problem Relation Age of Onset     Diabetes Mother      Heart Disease Father      Coronary Artery Disease Father      Social History     Socioeconomic History     Marital status:      Spouse name: Not on file     Number of children: Not on file     Years of education: Not on file     Highest education level: Not on file   Occupational History     Not on file   Tobacco Use     Smoking status: Never Smoker     Smokeless tobacco: Never Used   Substance and Sexual Activity     Alcohol use: Yes     Comment: Alcoholic Drinks/day: on special occasions/holidays     Drug use: No     Sexual activity: Not Currently   Other Topics Concern     Not on file   Social History Narrative     Not on file     Social Determinants of Health     Financial Resource Strain:      Difficulty of Paying Living Expenses:    Food Insecurity:      Worried About Running Out of Food in the Last Year:      Ran Out of Food in the Last Year:    Transportation Needs:      Lack of Transportation (Medical):      Lack of Transportation (Non-Medical):    Physical Activity:      Days of Exercise per Week:      Minutes of Exercise per Session:    Stress:      Feeling of Stress :    Social Connections:      Frequency of Communication with Friends and Family:      Frequency of Social Gatherings with Friends and Family:      Attends Hindu Services:      Active Member of Clubs or Organizations:      Attends Club or  Organization Meetings:      Marital Status:    Intimate Partner Violence:      Fear of Current or Ex-Partner:      Emotionally Abused:      Physically Abused:      Sexually Abused:        Current Outpatient Medications   Medication Sig Dispense Refill     acetaminophen (TYLENOL) 500 MG tablet [ACETAMINOPHEN (TYLENOL) 500 MG TABLET] Take 1-2 tablets (500-1,000 mg total) by mouth every 4 (four) hours as needed.  0     acetaminophen-codeine (TYLENOL #3) 300-30 MG tablet Take 1 tablet by mouth every 8 hours as needed for severe pain 12 tablet 0     aspirin 81 MG EC tablet [ASPIRIN 81 MG EC TABLET] Take 81 mg by mouth daily.       carboxymethylcellulose sodium (REFRESH TEARS OPHT) [CARBOXYMETHYLCELLULOSE SODIUM (REFRESH TEARS OPHT)] Administer 1 application to both eyes 2 (two) times a day.       cholecalciferol, vitamin D3, 1,000 unit tablet [CHOLECALCIFEROL, VITAMIN D3, 1,000 UNIT TABLET] Take 2,000 Units by mouth daily.       cyanocobalamin 1,000 mcg/mL injection [CYANOCOBALAMIN 1,000 MCG/ML INJECTION] Inject 1,000 mcg into the shoulder, thigh, or buttocks every 30 (thirty) days.       estradioL (ESTRACE) 0.01 % (0.1 mg/gram) vaginal cream [ESTRADIOL (ESTRACE) 0.01 % (0.1 MG/GRAM) VAGINAL CREAM] Insert 2 g into the vagina 3 (three) times a week.        ferrous sulfate 325 (65 FE) MG tablet [FERROUS SULFATE 325 (65 FE) MG TABLET] Take 1 tablet by mouth daily.       FOLIC ACID/MULTIVIT-MIN/LUTEIN (CENTRUM SILVER ORAL) [FOLIC ACID/MULTIVIT-MIN/LUTEIN (CENTRUM SILVER ORAL)] Take 1 tablet by mouth daily.       furosemide (LASIX) 20 MG tablet [FUROSEMIDE (LASIX) 20 MG TABLET] Take 10 mg by mouth daily.       ipratropium-albuterol (DUO-NEB) 0.5-2.5 mg/3 mL nebulizer [IPRATROPIUM-ALBUTEROL (DUO-NEB) 0.5-2.5 MG/3 ML NEBULIZER] Inhale 3 mL 2 (two) times a day.        isosorbide mononitrate (IMDUR) 30 MG 24 hr tablet [ISOSORBIDE MONONITRATE (IMDUR) 30 MG 24 HR TABLET] Take 1 tablet (30 mg total) by mouth daily. 30 tablet 0      loratadine (CLARITIN) 10 mg tablet [LORATADINE (CLARITIN) 10 MG TABLET] Take 10 mg by mouth daily.        metoprolol tartrate (LOPRESSOR) 25 MG tablet [METOPROLOL TARTRATE (LOPRESSOR) 25 MG TABLET] Take 12.5 mg by mouth 2 (two) times a day.        mirtazapine (REMERON) 7.5 MG tablet [MIRTAZAPINE (REMERON) 7.5 MG TABLET] Take 7.5 mg by mouth at bedtime.       nitroglycerin (NITROSTAT) 0.4 MG SL tablet [NITROGLYCERIN (NITROSTAT) 0.4 MG SL TABLET] Place 0.4 mg under the tongue every 5 (five) minutes as needed for chest pain.       omeprazole (PRILOSEC) 20 MG capsule [OMEPRAZOLE (PRILOSEC) 20 MG CAPSULE] Take 20 mg by mouth daily before breakfast.       polyethylene glycol (MIRALAX) 17 gram packet [POLYETHYLENE GLYCOL (MIRALAX) 17 GRAM PACKET] Take 17 g by mouth daily as needed.       vit A/vit C/vit E/zinc/copper (PRESERVISION AREDS ORAL) [VIT A/VIT C/VIT E/ZINC/COPPER (PRESERVISION AREDS ORAL)] Take 1 tablet by mouth 2 (two) times a day.       Allergies   Allergen Reactions     Ace Inhibitors Cough     Altace [Ramipril] Unknown     Hydrochlorothiazide Unknown     Morphine Nausea and Vomiting     Penicillins Hives     Simvastatin Unknown     Sulfa (Sulfonamide Antibiotics) [Sulfa Drugs] Unknown     unknown     Trimethoprim      Immunization History   Administered Date(s) Administered     COVID-19,PF,Pfizer 03/15/2021, 04/05/2021     Flu, Unspecified 09/15/2015     Influenza (High Dose) 3 valent vaccine 10/14/2018     Medications list and allergies in the facility chart have been reviewed.  Please see facility EMR for most up to date list.       Review of Systems   Patient denies fever, chills, headache, lightheadedness, dizziness, rhinorrhea, cough, congestion, shortness of breath, chest pain, palpitations, abdominal pain, n/v, diarrhea, constipation, change in appetite, change in sleep pattern, dysuria, frequency, burning or pain with urination.  Other than stated in HPI all other review of systems is negative.        Physical Exam   Vital signs:/60   Pulse 86   Temp 97.6  F (36.4  C)   Resp 18   Ht 1.524 m (5')   Wt 43.5 kg (96 lb)   SpO2 92%   BMI 18.75 kg/m     GENERAL APPEARANCE: frail elderly female, in no acute distress.  HEENT: normocephalic, atraumatic   sclerae anicteric, conjunctivae clear and moist, EOM intact  LUNGS: Lung sounds CTA, no adventitious sounds, respiratory effort normal.  CARD: RRR, S1, S2, without murmurs, gallops, rubs   ABD: Soft, nondistended and nontender with normal bowel sounds.   MSK: Muscle strength and tone were equal bilaterally. Moves all extremities easily and intentionally.   EXTREMITIES: No cyanosis, clubbing or edema.  NEURO: Alert and oriented x 3.Face is symmetric.  SKIN: Inspection of the skin reveals no rashes, ulcerations or petechiae.  PSYCH: euthymic      Labs:    Recent Results (from the past 240 hour(s))   CBC (+ platelets, no diff)    Collection Time: 07/25/21  7:17 PM   Result Value Ref Range    WBC Count 7.4 4.0 - 11.0 10e3/uL    RBC Count 2.98 (L) 3.80 - 5.20 10e6/uL    Hemoglobin 10.1 (L) 11.7 - 15.7 g/dL    Hematocrit 29.9 (L) 35.0 - 47.0 %     78 - 100 fL    MCH 33.9 (H) 26.5 - 33.0 pg    MCHC 33.8 31.5 - 36.5 g/dL    RDW 11.9 10.0 - 15.0 %    Platelet Count 166 150 - 450 10e3/uL   Basic metabolic panel    Collection Time: 07/25/21  7:17 PM   Result Value Ref Range    Sodium 142 136 - 145 mmol/L    Potassium 3.7 3.5 - 5.0 mmol/L    Chloride 108 (H) 98 - 107 mmol/L    Carbon Dioxide (CO2) 24 22 - 31 mmol/L    Anion Gap 10 5 - 18 mmol/L    Urea Nitrogen 17 8 - 28 mg/dL    Creatinine 1.19 (H) 0.60 - 1.10 mg/dL    Calcium 8.9 8.5 - 10.5 mg/dL    Glucose 101 70 - 125 mg/dL    GFR Estimate 39 (L) >60 mL/min/1.73m2   INR    Collection Time: 07/25/21  7:17 PM   Result Value Ref Range    INR 1.07 0.90 - 1.15   SARS-COV2 (COVID-19) Virus RT-PCR    Collection Time: 07/31/21  5:15 PM    Specimen: Nasopharyngeal; Swab   Result Value Ref Range    SARS CoV2 PCR  Positive (A) Negative   SARS-COV2 (COVID-19) Virus RT-PCR    Collection Time: 07/31/21  6:45 PM    Specimen: Nasopharyngeal; Swab   Result Value Ref Range    SARS CoV2 PCR Negative Negative         Assessment/plan:  Closed fracture of sacrum, unspecified portion of sacrum, sequela  Pain needs to be better controlled.  Counseled patient that walking was the best rehab for sacral fx.  Will scheduled T3 at HS x 7 days.  Continue with prn T3 and apap.  Will need to follow up with neurosurgery.     Chronic obstructive pulmonary disease, unspecified COPD type (H)  No exacerbation, uses duonebs BID at home however due to Covid policy neb was changed to combivent inhaler.  Once off isolation than could change back to duonebs.     Chronic diastolic congestive heart failure (H)  Compensated, low dose lasix and metoprolol    Other forms of angina pectoris (H)  No chest pain, on home Imdur    Falls frequently  Physical and occupational therapy.     2019 novel coronavirus disease (COVID-19)  Asymptomatic, has been fully vaccinated.  Isolate and monitor.  Nebs were changed to inhaler due to policy for no aerolized procedures.     Spinal stenosis of lumbar region with neurogenic claudication  Will need follow up with neurosurgery.      35 total minutes spent with 20 minutes spent with patient and nursing in coordination plan of care and counseling regarding pain management and therapy.     Electronically signed by: Kelley Hernandez NP          Sincerely,        Kelley Hernandez NP

## 2021-08-05 ENCOUNTER — LAB REQUISITION (OUTPATIENT)
Dept: LAB | Facility: CLINIC | Age: 86
End: 2021-08-05

## 2021-08-05 DIAGNOSIS — U07.1 COVID-19: ICD-10-CM

## 2021-08-05 PROCEDURE — U0005 INFEC AGEN DETEC AMPLI PROBE: HCPCS | Performed by: FAMILY MEDICINE

## 2021-08-06 ENCOUNTER — TRANSITIONAL CARE UNIT VISIT (OUTPATIENT)
Dept: GERIATRICS | Facility: CLINIC | Age: 86
End: 2021-08-06
Payer: COMMERCIAL

## 2021-08-06 DIAGNOSIS — I25.83 CORONARY ARTERY DISEASE DUE TO LIPID RICH PLAQUE: ICD-10-CM

## 2021-08-06 DIAGNOSIS — M54.50 ACUTE LOW BACK PAIN, UNSPECIFIED BACK PAIN LATERALITY, UNSPECIFIED WHETHER SCIATICA PRESENT: Primary | ICD-10-CM

## 2021-08-06 DIAGNOSIS — S32.10XD CLOSED FRACTURE OF SACRUM WITH ROUTINE HEALING, UNSPECIFIED PORTION OF SACRUM, SUBSEQUENT ENCOUNTER: ICD-10-CM

## 2021-08-06 DIAGNOSIS — I10 ESSENTIAL HYPERTENSION: ICD-10-CM

## 2021-08-06 DIAGNOSIS — I25.10 CORONARY ARTERY DISEASE DUE TO LIPID RICH PLAQUE: ICD-10-CM

## 2021-08-06 LAB — SARS-COV-2 RNA RESP QL NAA+PROBE: NEGATIVE

## 2021-08-06 PROCEDURE — 99305 1ST NF CARE MODERATE MDM 35: CPT | Performed by: FAMILY MEDICINE

## 2021-08-06 ASSESSMENT — MIFFLIN-ST. JEOR: SCORE: 757.4

## 2021-08-06 NOTE — LETTER
"    8/6/2021        RE: Aleisha Singh  [address] [place] [address]          [facility] GER[facility]    [place] [facility] Medical Record Number:  [id number]  Place of Service where encounter took place: [facility] (U) [[id number]]  CODE STATUS:   CPR/Full code       Chief Complaint/Reason for Visit:  Chief Complaint   Patient presents with     Hospital F/U     Admit to TCU for sacral fracture, spinal stenosis.        HPI:    Alesiha Singh is a 89 year old female wit hx of HTN, CAD with hx MI, CHF, seen in the ED on 7/25/2021 for back pain from a fall some days earlier. She was diagnosed with left sacral fracture and spinal stenosis. Per ED note \"spoke with [facility] Neurosurgery PA, who recommended follow-up in clinic (either [facility] or [facility]). Consider pain management consult for injection. Patient not a good surgical candidate and patient and her daughter denied any interest in surgery given her age\".  She was \"offered admission for pain control and PT / OT, however patient prefers to discharged home.  She is feeling better after Tylenol with codeine and a lidocaine patch was applied\". She was discharged to home with recommendation for follow up with ortho/spine. She was seen again in the ED on 7/30/2021 for increasing pain, unable to manage at home. Arrangements were made for TCU admission on 7/30/2021 for PT, OT, nursing cares, medical management and monitoring.       Today:  She was admitted here to TCU from the ED on 7/30/2021. She had a surveillance COVID-19 test done in the facility on 7/31/2021 that resulted positive. Per Epic, it appears there are two tests done on same day with different collection times, one is positive and one is negative. Details unclear. She is treated as positive. She has been vaccinated and has no symptoms but was isolated per protocol in the facility. She had a repeat test on 8/3/2021 which resulted neg, she remains in isolation with COVID-19 " facility protocols.     She is seen today, has no complaints. No fever, cough, shortness of breath, chest pain, hypoxia. No sx of COVID pneumonia. Her appetite is fair to good. No nausea, vomiting, diarrhea or constipation. She denies abdominal pain. No urinary issues. Does have some back pain at times, not currently. Has prn tylenol or tylenol with codeine. She is working with therapy. No new vision or hearing problems. She lives with her daughter, plans to return there after TCU stay.      PAST MEDICAL HISTORY:  Past Medical History:   Diagnosis Date     Acute pancreatitis      Arthritis      ASHD (arteriosclerotic heart disease)     80% mid LAD, 70% mid distal LAD, 90% L Cx     B12 deficiency      Bilateral cataracts     History -surgery completed     CAD S/P percutaneous coronary angioplasty 04/2015    RCA, diagonal     CHF (congestive heart failure) (H)      COPD (chronic obstructive pulmonary disease) (H)      Depression      Dermatitis     Left back-itchy rash     Diverticulitis      GERD (gastroesophageal reflux disease)      Idiopathic pancreatitis      Left wrist fracture 10/01/2015     Lung nodule      Osteopenia      Pancreas divisum      Retinal detachment      STEMI (ST elevation myocardial infarction) (H) 04/2015       PAST SURGICAL HISTORY:  Past Surgical History:   Procedure Laterality Date     ANGIOPLASTY  04/2015    Per patient report 6 cardiac stents placed     APPENDECTOMY       CATARACT EXTRACTION Left      ENDOSCOPIC RETROGRADE CHOLANGIOPANCREATOGRAM  03/2011     HYSTERECTOMY       LAPAROSCOPIC CHOLECYSTECTOMY N/A 1/22/2019    Procedure: CHOLECYSTECTOMY, LAPAROSCOPIC;  Surgeon: Luke Mehta MD;  Location: [facility];  Service: General     ORIF TOE FRACTURE       TOTAL HIP ARTHROPLASTY Right        FAMILY HISTORY:  Family History   Problem Relation Age of Onset     Diabetes Mother      Heart Disease Father      Coronary Artery Disease Father        SOCIAL HISTORY:  Social History      Socioeconomic History     Marital status:      Spouse name: Not on file     Number of children: Not on file     Years of education: Not on file     Highest education level: Not on file   Occupational History     Not on file   Tobacco Use     Smoking status: Never Smoker     Smokeless tobacco: Never Used   Substance and Sexual Activity     Alcohol use: Yes     Comment: Alcoholic Drinks/day: on special occasions/holidays     Drug use: No     Sexual activity: Not Currently   Other Topics Concern     Not on file   Social History Narrative     Not on file     Social Determinants of Health     Financial Resource Strain:      Difficulty of Paying Living Expenses:    Food Insecurity:      Worried About Running Out of Food in the Last Year:      Ran Out of Food in the Last Year:    Transportation Needs:      Lack of Transportation (Medical):      Lack of Transportation (Non-Medical):    Physical Activity:      Days of Exercise per Week:      Minutes of Exercise per Session:    Stress:      Feeling of Stress :    Social Connections:      Frequency of Communication with Friends and Family:      Frequency of Social Gatherings with Friends and Family:      Attends Hoahaoism Services:      Active Member of Clubs or Organizations:      Attends Club or Organization Meetings:      Marital Status:    Intimate Partner Violence:      Fear of Current or Ex-Partner:      Emotionally Abused:      Physically Abused:      Sexually Abused:        MEDICATIONS:  Current Outpatient Medications   Medication Sig Dispense Refill     acetaminophen (TYLENOL) 500 MG tablet [ACETAMINOPHEN (TYLENOL) 500 MG TABLET] Take 1-2 tablets (500-1,000 mg total) by mouth every 4 (four) hours as needed.  0     acetaminophen-codeine (TYLENOL #3) 300-30 MG tablet Take 1 tablet by mouth At Bedtime       acetaminophen-codeine (TYLENOL #3) 300-30 MG tablet Take 1 tablet by mouth every 8 hours as needed for severe pain 12 tablet 0     aspirin 81 MG EC tablet  [ASPIRIN 81 MG EC TABLET] Take 81 mg by mouth daily.       carboxymethylcellulose sodium (REFRESH TEARS OPHT) [CARBOXYMETHYLCELLULOSE SODIUM (REFRESH TEARS OPHT)] Administer 1 application to both eyes 2 (two) times a day.       cholecalciferol, vitamin D3, 1,000 unit tablet [CHOLECALCIFEROL, VITAMIN D3, 1,000 UNIT TABLET] Take 2,000 Units by mouth daily.       cyanocobalamin 1,000 mcg/mL injection [CYANOCOBALAMIN 1,000 MCG/ML INJECTION] Inject 1,000 mcg into the shoulder, thigh, or buttocks every 30 (thirty) days.       estradioL (ESTRACE) 0.01 % (0.1 mg/gram) vaginal cream [ESTRADIOL (ESTRACE) 0.01 % (0.1 MG/GRAM) VAGINAL CREAM] Insert 2 g into the vagina 3 (three) times a week.        ferrous sulfate 325 (65 FE) MG tablet [FERROUS SULFATE 325 (65 FE) MG TABLET] Take 1 tablet by mouth daily.       FOLIC ACID/MULTIVIT-MIN/LUTEIN (CENTRUM SILVER ORAL) [FOLIC ACID/MULTIVIT-MIN/LUTEIN (CENTRUM SILVER ORAL)] Take 1 tablet by mouth daily.       furosemide (LASIX) 20 MG tablet [FUROSEMIDE (LASIX) 20 MG TABLET] Take 10 mg by mouth daily.       ipratropium-albuterol (COMBIVENT RESPIMAT)  MCG/ACT inhaler Inhale 1 puff into the lungs 2 times daily       isosorbide mononitrate (IMDUR) 30 MG 24 hr tablet [ISOSORBIDE MONONITRATE (IMDUR) 30 MG 24 HR TABLET] Take 1 tablet (30 mg total) by mouth daily. 30 tablet 0     loratadine (CLARITIN) 10 mg tablet [LORATADINE (CLARITIN) 10 MG TABLET] Take 10 mg by mouth daily.        metoprolol tartrate (LOPRESSOR) 25 MG tablet [METOPROLOL TARTRATE (LOPRESSOR) 25 MG TABLET] Take 12.5 mg by mouth 2 (two) times a day.        mirtazapine (REMERON) 7.5 MG tablet [MIRTAZAPINE (REMERON) 7.5 MG TABLET] Take 7.5 mg by mouth at bedtime.       nitroglycerin (NITROSTAT) 0.4 MG SL tablet [NITROGLYCERIN (NITROSTAT) 0.4 MG SL TABLET] Place 0.4 mg under the tongue every 5 (five) minutes as needed for chest pain.       omeprazole (PRILOSEC) 20 MG capsule [OMEPRAZOLE (PRILOSEC) 20 MG CAPSULE] Take 20 mg  by mouth daily before breakfast.       polyethylene glycol (MIRALAX) 17 gram packet [POLYETHYLENE GLYCOL (MIRALAX) 17 GRAM PACKET] Take 17 g by mouth daily as needed.       vit A/vit C/vit E/zinc/copper (PRESERVISION AREDS ORAL) [VIT A/VIT C/VIT E/ZINC/COPPER (PRESERVISION AREDS ORAL)] Take 1 tablet by mouth 2 (two) times a day.          ALLERGIES:  Allergies   Allergen Reactions     Ace Inhibitors Cough     Altace [Ramipril] Unknown     Hydrochlorothiazide Unknown     Morphine Nausea and Vomiting     Penicillins Hives     Simvastatin Unknown     Sulfa (Sulfonamide Antibiotics) [Sulfa Drugs] Unknown     unknown     Trimethoprim        REVIEW OF SYSTEMS:  Pertinent items as noted in HPI.      PHYSICAL EXAM:  Note: COVID-19 pandemic precautions in place. Physical exam performed with social distancing considerations.  General: Patient is alert female, no distress.   Vitals: /69   Pulse 76   Temp 97.6  F (36.4  C)   Resp 20   Ht 1.524 m (5')   Wt 44.1 kg (97 lb 3.2 oz)   SpO2 96%   BMI 18.98 kg/m    HEENT: Head is NCAT. Eyes show no injection or icterus. Nares negative. Oropharynx well hydrated.  Neck: No JVD.  Lungs: Non labored respirations.   Back: No spinal or CVA tenderness.  Abdomen: Soft, no tenderness on exam.. No guarding rebound or rigidity.  : Deferred.  Extremities: No LE edema is noted.  Musculoskeletal: Age related degen changes.   Skin: No rashes noted.   Psych: Mood appears good.      LABS/DIAGNOSTIC DATA:  Component      Latest Ref Rng & Units 7/25/2021             WBC      4.0 - 11.0 10e3/uL 7.4   RBC Count      3.80 - 5.20 10e6/uL 2.98 (L)   Hemoglobin      11.7 - 15.7 g/dL 10.1 (L)   Hematocrit      35.0 - 47.0 % 29.9 (L)   MCV      78 - 100 fL 100   MCH      26.5 - 33.0 pg 33.9 (H)   MCHC      31.5 - 36.5 g/dL 33.8   RDW      10.0 - 15.0 % 11.9   Platelet Count      150 - 450 10e3/uL 166     Component      Latest Ref Rng & Units 7/25/2021   Sodium      136 - 145 mmol/L 142    Potassium      3.5 - 5.0 mmol/L 3.7   Chloride      98 - 107 mmol/L 108 (H)   Carbon Dioxide      22 - 31 mmol/L 24   Anion Gap      5 - 18 mmol/L 10   Glucose      70 - 125 mg/dL 101   Urea Nitrogen      8 - 28 mg/dL 17   Creatinine      0.60 - 1.10 mg/dL 1.19 (H)   GFR Estimate      >60 mL/min/1.73m2 39 (L)   Calcium      8.5 - 10.5 mg/dL 8.9         ASSESSMENT/PLAN:  1. Back pain. Spinal stenosis. Conservative management. Trial of therapies. Follow up with ortho/spine.   2. Left sacral fracture. Fall at home. Here for therapies. WBAT. Adequate pain management  3. HTN. On Metoprolol. Monitor BPs in TCU.  4. CAD. Hx MI and stents. On isosorbide, lasix, aspirin. No complaints of chest pain or shortness of breath. Monitor.  5. COVID-19 positive. She is in isolation. Asymptomatic, had been vaccinated.  6. Anemia. Continue PTA iron. Last hgb at 10.1.   7. COPD. Stable, has combivent inhaler. Monitor closely.  8. Code status is full code.         Electronically signed by: Latasha Martino MD             Sincerely,        Latasha Martino MD

## 2021-08-09 VITALS
DIASTOLIC BLOOD PRESSURE: 69 MMHG | SYSTOLIC BLOOD PRESSURE: 123 MMHG | OXYGEN SATURATION: 96 % | WEIGHT: 97.2 LBS | HEIGHT: 60 IN | RESPIRATION RATE: 20 BRPM | TEMPERATURE: 97.6 F | BODY MASS INDEX: 19.08 KG/M2 | HEART RATE: 76 BPM

## 2021-08-12 ENCOUNTER — LAB REQUISITION (OUTPATIENT)
Dept: LAB | Facility: CLINIC | Age: 86
End: 2021-08-12
Payer: COMMERCIAL

## 2021-08-12 ENCOUNTER — LAB REQUISITION (OUTPATIENT)
Dept: LAB | Facility: CLINIC | Age: 86
End: 2021-08-12

## 2021-08-12 DIAGNOSIS — U07.1 COVID-19: ICD-10-CM

## 2021-08-12 PROCEDURE — U0005 INFEC AGEN DETEC AMPLI PROBE: HCPCS | Performed by: FAMILY MEDICINE

## 2021-08-13 LAB — SARS-COV-2 RNA RESP QL NAA+PROBE: NEGATIVE

## 2021-08-15 NOTE — PROGRESS NOTES
"  [facility] SERVICES    [place] Medical Record Number:  [id number]  Place of Service where encounter took place: [facility] ([facility]) [[id number]]  CODE STATUS:   CPR/Full code       Chief Complaint/Reason for Visit:  Chief Complaint   Patient presents with     Hospital F/U     Admit to TCU for sacral fracture, spinal stenosis.        HPI:    Aleisha Singh is a 89 year old female wit hx of HTN, CAD with hx MI, CHF, seen in the ED on 7/25/2021 for back pain from a fall some days earlier. She was diagnosed with left sacral fracture and spinal stenosis. Per ED note \"spoke with Shannan Neurosurgery PA, who recommended follow-up in clinic (either [facility] or [facility]). Consider pain management consult for injection. Patient not a good surgical candidate and patient and her daughter denied any interest in surgery given her age\".  She was \"offered admission for pain control and PT / OT, however patient prefers to discharged home.  She is feeling better after Tylenol with codeine and a lidocaine patch was applied\". She was discharged to home with recommendation for follow up with ortho/spine. She was seen again in the ED on 7/30/2021 for increasing pain, unable to manage at home. Arrangements were made for TCU admission on 7/30/2021 for PT, OT, nursing cares, medical management and monitoring.       Today:  She was admitted here to TCU from the ED on 7/30/2021. She had a surveillance COVID-19 test done in the facility on 7/31/2021 that resulted positive. Per Epic, it appears there are two tests done on same day with different collection times, one is positive and one is negative. Details unclear. She is treated as positive. She has been vaccinated and has no symptoms but was isolated per protocol in the facility. She had a repeat test on 8/3/2021 which resulted neg, she remains in isolation with COVID-19 facility protocols.     She is seen today, has no complaints. No fever, cough, shortness of " breath, chest pain, hypoxia. No sx of COVID pneumonia. Her appetite is fair to good. No nausea, vomiting, diarrhea or constipation. She denies abdominal pain. No urinary issues. Does have some back pain at times, not currently. Has prn tylenol or tylenol with codeine. She is working with therapy. No new vision or hearing problems. She lives with her daughter, plans to return there after TCU stay.      PAST MEDICAL HISTORY:  Past Medical History:   Diagnosis Date     Acute pancreatitis      Arthritis      ASHD (arteriosclerotic heart disease)     80% mid LAD, 70% mid distal LAD, 90% L Cx     B12 deficiency      Bilateral cataracts     History -surgery completed     CAD S/P percutaneous coronary angioplasty 04/2015    RCA, diagonal     CHF (congestive heart failure) (H)      COPD (chronic obstructive pulmonary disease) (H)      Depression      Dermatitis     Left back-itchy rash     Diverticulitis      GERD (gastroesophageal reflux disease)      Idiopathic pancreatitis      Left wrist fracture 10/01/2015     Lung nodule      Osteopenia      Pancreas divisum      Retinal detachment      STEMI (ST elevation myocardial infarction) (H) 04/2015       PAST SURGICAL HISTORY:  Past Surgical History:   Procedure Laterality Date     ANGIOPLASTY  04/2015    Per patient report 6 cardiac stents placed     APPENDECTOMY       CATARACT EXTRACTION Left      ENDOSCOPIC RETROGRADE CHOLANGIOPANCREATOGRAM  03/2011     HYSTERECTOMY       LAPAROSCOPIC CHOLECYSTECTOMY N/A 1/22/2019    Procedure: CHOLECYSTECTOMY, LAPAROSCOPIC;  Surgeon: Luke Mehta MD;  Location: [facility];  Service: General     ORIF TOE FRACTURE       TOTAL HIP ARTHROPLASTY Right        FAMILY HISTORY:  Family History   Problem Relation Age of Onset     Diabetes Mother      Heart Disease Father      Coronary Artery Disease Father        SOCIAL HISTORY:  Social History     Socioeconomic History     Marital status:      Spouse name: Not on file     Number  of children: Not on file     Years of education: Not on file     Highest education level: Not on file   Occupational History     Not on file   Tobacco Use     Smoking status: Never Smoker     Smokeless tobacco: Never Used   Substance and Sexual Activity     Alcohol use: Yes     Comment: Alcoholic Drinks/day: on special occasions/holidays     Drug use: No     Sexual activity: Not Currently   Other Topics Concern     Not on file   Social History Narrative     Not on file     Social Determinants of Health     Financial Resource Strain:      Difficulty of Paying Living Expenses:    Food Insecurity:      Worried About Running Out of Food in the Last Year:      Ran Out of Food in the Last Year:    Transportation Needs:      Lack of Transportation (Medical):      Lack of Transportation (Non-Medical):    Physical Activity:      Days of Exercise per Week:      Minutes of Exercise per Session:    Stress:      Feeling of Stress :    Social Connections:      Frequency of Communication with Friends and Family:      Frequency of Social Gatherings with Friends and Family:      Attends [employer] Services:      Active Member of Clubs or Organizations:      Attends Club or Organization Meetings:      Marital Status:    Intimate Partner Violence:      Fear of Current or Ex-Partner:      Emotionally Abused:      Physically Abused:      Sexually Abused:        MEDICATIONS:  Current Outpatient Medications   Medication Sig Dispense Refill     acetaminophen (TYLENOL) 500 MG tablet [ACETAMINOPHEN (TYLENOL) 500 MG TABLET] Take 1-2 tablets (500-1,000 mg total) by mouth every 4 (four) hours as needed.  0     acetaminophen-codeine (TYLENOL #3) 300-30 MG tablet Take 1 tablet by mouth At Bedtime       acetaminophen-codeine (TYLENOL #3) 300-30 MG tablet Take 1 tablet by mouth every 8 hours as needed for severe pain 12 tablet 0     aspirin 81 MG EC tablet [ASPIRIN 81 MG EC TABLET] Take 81 mg by mouth daily.       carboxymethylcellulose sodium  (REFRESH TEARS OPHT) [CARBOXYMETHYLCELLULOSE SODIUM (REFRESH TEARS OPHT)] Administer 1 application to both eyes 2 (two) times a day.       cholecalciferol, vitamin D3, 1,000 unit tablet [CHOLECALCIFEROL, VITAMIN D3, 1,000 UNIT TABLET] Take 2,000 Units by mouth daily.       cyanocobalamin 1,000 mcg/mL injection [CYANOCOBALAMIN 1,000 MCG/ML INJECTION] Inject 1,000 mcg into the shoulder, thigh, or buttocks every 30 (thirty) days.       estradioL (ESTRACE) 0.01 % (0.1 mg/gram) vaginal cream [ESTRADIOL (ESTRACE) 0.01 % (0.1 MG/GRAM) VAGINAL CREAM] Insert 2 g into the vagina 3 (three) times a week.        ferrous sulfate 325 (65 FE) MG tablet [FERROUS SULFATE 325 (65 FE) MG TABLET] Take 1 tablet by mouth daily.       FOLIC ACID/MULTIVIT-MIN/LUTEIN (CENTRUM SILVER ORAL) [FOLIC ACID/MULTIVIT-MIN/LUTEIN (CENTRUM SILVER ORAL)] Take 1 tablet by mouth daily.       furosemide (LASIX) 20 MG tablet [FUROSEMIDE (LASIX) 20 MG TABLET] Take 10 mg by mouth daily.       ipratropium-albuterol (COMBIVENT RESPIMAT)  MCG/ACT inhaler Inhale 1 puff into the lungs 2 times daily       isosorbide mononitrate (IMDUR) 30 MG 24 hr tablet [ISOSORBIDE MONONITRATE (IMDUR) 30 MG 24 HR TABLET] Take 1 tablet (30 mg total) by mouth daily. 30 tablet 0     loratadine (CLARITIN) 10 mg tablet [LORATADINE (CLARITIN) 10 MG TABLET] Take 10 mg by mouth daily.        metoprolol tartrate (LOPRESSOR) 25 MG tablet [METOPROLOL TARTRATE (LOPRESSOR) 25 MG TABLET] Take 12.5 mg by mouth 2 (two) times a day.        mirtazapine (REMERON) 7.5 MG tablet [MIRTAZAPINE (REMERON) 7.5 MG TABLET] Take 7.5 mg by mouth at bedtime.       nitroglycerin (NITROSTAT) 0.4 MG SL tablet [NITROGLYCERIN (NITROSTAT) 0.4 MG SL TABLET] Place 0.4 mg under the tongue every 5 (five) minutes as needed for chest pain.       omeprazole (PRILOSEC) 20 MG capsule [OMEPRAZOLE (PRILOSEC) 20 MG CAPSULE] Take 20 mg by mouth daily before breakfast.       polyethylene glycol (MIRALAX) 17 gram packet  [POLYETHYLENE GLYCOL (MIRALAX) 17 GRAM PACKET] Take 17 g by mouth daily as needed.       vit A/vit C/vit E/zinc/copper (PRESERVISION AREDS ORAL) [VIT A/VIT C/VIT E/ZINC/COPPER (PRESERVISION AREDS ORAL)] Take 1 tablet by mouth 2 (two) times a day.          ALLERGIES:  Allergies   Allergen Reactions     Ace Inhibitors Cough     Altace [Ramipril] Unknown     Hydrochlorothiazide Unknown     Morphine Nausea and Vomiting     Penicillins Hives     Simvastatin Unknown     Sulfa (Sulfonamide Antibiotics) [Sulfa Drugs] Unknown     unknown     Trimethoprim        REVIEW OF SYSTEMS:  Pertinent items as noted in HPI.      PHYSICAL EXAM:  Note: COVID-19 pandemic precautions in place. Physical exam performed with social distancing considerations.  General: Patient is alert female, no distress.   Vitals: /69   Pulse 76   Temp 97.6  F (36.4  C)   Resp 20   Ht 1.524 m (5')   Wt 44.1 kg (97 lb 3.2 oz)   SpO2 96%   BMI 18.98 kg/m    HEENT: Head is NCAT. Eyes show no injection or icterus. Nares negative. Oropharynx well hydrated.  Neck: No JVD.  Lungs: Non labored respirations.   Back: No spinal or CVA tenderness.  Abdomen: Soft, no tenderness on exam.. No guarding rebound or rigidity.  : Deferred.  Extremities: No LE edema is noted.  Musculoskeletal: Age related degen changes.   Skin: No rashes noted.   Psych: Mood appears good.      LABS/DIAGNOSTIC DATA:  Component      Latest Ref Rng & Units 7/25/2021             WBC      4.0 - 11.0 10e3/uL 7.4   RBC Count      3.80 - 5.20 10e6/uL 2.98 (L)   Hemoglobin      11.7 - 15.7 g/dL 10.1 (L)   Hematocrit      35.0 - 47.0 % 29.9 (L)   MCV      78 - 100 fL 100   MCH      26.5 - 33.0 pg 33.9 (H)   MCHC      31.5 - 36.5 g/dL 33.8   RDW      10.0 - 15.0 % 11.9   Platelet Count      150 - 450 10e3/uL 166     Component      Latest Ref Rng & Units 7/25/2021   Sodium      136 - 145 mmol/L 142   Potassium      3.5 - 5.0 mmol/L 3.7   Chloride      98 - 107 mmol/L 108 (H)   Carbon  Dioxide      22 - 31 mmol/L 24   Anion Gap      5 - 18 mmol/L 10   Glucose      70 - 125 mg/dL 101   Urea Nitrogen      8 - 28 mg/dL 17   Creatinine      0.60 - 1.10 mg/dL 1.19 (H)   GFR Estimate      >60 mL/min/1.73m2 39 (L)   Calcium      8.5 - 10.5 mg/dL 8.9         ASSESSMENT/PLAN:  1. Back pain. Spinal stenosis. Conservative management. Trial of therapies. Follow up with ortho/spine.   2. Left sacral fracture. Fall at home. Here for therapies. WBAT. Adequate pain management  3. HTN. On Metoprolol. Monitor BPs in TCU.  4. CAD. Hx MI and stents. On isosorbide, lasix, aspirin. No complaints of chest pain or shortness of breath. Monitor.  5. COVID-19 positive. She is in isolation. Asymptomatic, had been vaccinated.  6. Anemia. Continue PTA iron. Last hgb at 10.1.   7. COPD. Stable, has combivent inhaler. Monitor closely.  8. Code status is full code.         Electronically signed by: Latasha Martino MD

## 2021-08-17 ENCOUNTER — LAB REQUISITION (OUTPATIENT)
Dept: LAB | Facility: CLINIC | Age: 86
End: 2021-08-17
Payer: COMMERCIAL

## 2021-08-17 DIAGNOSIS — U07.1 COVID-19: ICD-10-CM

## 2021-08-18 ENCOUNTER — DISCHARGE SUMMARY NURSING HOME (OUTPATIENT)
Dept: GERIATRICS | Facility: CLINIC | Age: 86
End: 2021-08-18
Payer: COMMERCIAL

## 2021-08-18 VITALS
RESPIRATION RATE: 18 BRPM | OXYGEN SATURATION: 95 % | TEMPERATURE: 98 F | WEIGHT: 96.6 LBS | HEART RATE: 78 BPM | SYSTOLIC BLOOD PRESSURE: 124 MMHG | HEIGHT: 60 IN | DIASTOLIC BLOOD PRESSURE: 73 MMHG | BODY MASS INDEX: 18.97 KG/M2

## 2021-08-18 DIAGNOSIS — S32.10XD CLOSED FRACTURE OF SACRUM WITH ROUTINE HEALING, UNSPECIFIED PORTION OF SACRUM, SUBSEQUENT ENCOUNTER: ICD-10-CM

## 2021-08-18 DIAGNOSIS — R29.6 FALLS FREQUENTLY: ICD-10-CM

## 2021-08-18 DIAGNOSIS — U07.1 2019 NOVEL CORONAVIRUS DISEASE (COVID-19): ICD-10-CM

## 2021-08-18 DIAGNOSIS — J44.9 CHRONIC OBSTRUCTIVE PULMONARY DISEASE, UNSPECIFIED COPD TYPE (H): ICD-10-CM

## 2021-08-18 DIAGNOSIS — M54.50 ACUTE LOW BACK PAIN, UNSPECIFIED BACK PAIN LATERALITY, UNSPECIFIED WHETHER SCIATICA PRESENT: Primary | ICD-10-CM

## 2021-08-18 DIAGNOSIS — I10 ESSENTIAL HYPERTENSION: ICD-10-CM

## 2021-08-18 PROCEDURE — 99315 NF DSCHRG MGMT 30 MIN/LESS: CPT | Performed by: FAMILY MEDICINE

## 2021-08-18 ASSESSMENT — MIFFLIN-ST. JEOR: SCORE: 754.67

## 2021-08-18 NOTE — LETTER
8/18/2021        RE: Aleisha Singh  [address]  [place] [address]        [facility] GERIATRIC SERVICES       Patient Aleisha Singh  MRN: [id number]    [facility]    Reason for Visit     Chief Complaint   Patient presents with     Discharge Summary Nursing Home       Code Status     CPR/Full code     Assessment     COVID-19 positive status in a patient has been fully vaccinated  COPD  Back pain with spinal stenosis  Left sacral fracture  History of a fall  Generalized weakness    History     Patient is a very pleasant 89 year old female who is admitted to TCU  Patient presented to the hospital after she fell.  She was noted to have left sacral fracture.  She is weightbearing as tolerated.  Pain management was optimized and she was discharged to the TCU.  Unfortunately in the nursing home she was noted to have COVID-19 positive status.  She has baseline COPD and uses inhalers.  However clinically she was in isolation and remains asymptomatic she has not been febrile.  No cough or congestion reported subsequently her Covid testing was negative and her contact isolation has been lifted after she has been asymptomatic.  She has chronic back pain with spinal stenosis.  She has chronic anemia last hemoglobin was 10.  She is underweight and due to poor oral intake she has been started on supplementation  BIMS 7/15 MOOD stable      Past Medical & Surgical History     PAST MEDICAL HISTORY:   Past Medical History:   Diagnosis Date     Acute pancreatitis      Arthritis      ASHD (arteriosclerotic heart disease)     80% mid LAD, 70% mid distal LAD, 90% L Cx     B12 deficiency      Bilateral cataracts     History -surgery completed     CAD S/P percutaneous coronary angioplasty 04/2015    RCA, diagonal     CHF (congestive heart failure) (H)      COPD (chronic obstructive pulmonary disease) (H)      Depression      Dermatitis     Left back-itchy rash     Diverticulitis      GERD (gastroesophageal reflux  disease)      Idiopathic pancreatitis      Left wrist fracture 10/01/2015     Lung nodule      Osteopenia      Pancreas divisum      Retinal detachment      STEMI (ST elevation myocardial infarction) (H) 04/2015      PAST SURGICAL HISTORY:   has a past surgical history that includes appendectomy; Total Hip Arthroplasty (Right); Endoscopic retrograde cholangiopancreatogram (03/2011); Cataract Extraction (Left); Orif Toe Fracture; Hysterectomy; Angioplasty (04/2015); and Laparoscopic cholecystectomy (N/A, 1/22/2019).      Past Social History     Reviewed,  reports that she has never smoked. She has never used smokeless tobacco. She reports current alcohol use. She reports that she does not use drugs.    Family History     Reviewed, and family history includes Coronary Artery Disease in her father; Diabetes in her mother; Heart Disease in her father.    Medication List   Post Discharge Medication Reconciliation Status: Post Discharge Medication Reconciliation Status: discharge medications reconciled, continue medications without change.  Current Outpatient Medications   Medication     acetaminophen (TYLENOL) 500 MG tablet     acetaminophen-codeine (TYLENOL #3) 300-30 MG tablet     acetaminophen-codeine (TYLENOL #3) 300-30 MG tablet     aspirin 81 MG EC tablet     carboxymethylcellulose sodium (REFRESH TEARS OPHT)     cholecalciferol, vitamin D3, 1,000 unit tablet     cyanocobalamin 1,000 mcg/mL injection     estradioL (ESTRACE) 0.01 % (0.1 mg/gram) vaginal cream     ferrous sulfate 325 (65 FE) MG tablet     FOLIC ACID/MULTIVIT-MIN/LUTEIN (CENTRUM SILVER ORAL)     furosemide (LASIX) 20 MG tablet     ipratropium-albuterol (COMBIVENT RESPIMAT)  MCG/ACT inhaler     isosorbide mononitrate (IMDUR) 30 MG 24 hr tablet     loratadine (CLARITIN) 10 mg tablet     metoprolol tartrate (LOPRESSOR) 25 MG tablet     mirtazapine (REMERON) 7.5 MG tablet     nitroglycerin (NITROSTAT) 0.4 MG SL tablet     omeprazole (PRILOSEC) 20 MG  capsule     polyethylene glycol (MIRALAX) 17 gram packet     vit A/vit C/vit E/zinc/copper (PRESERVISION AREDS ORAL)     No current facility-administered medications for this visit.       Allergies     Allergies   Allergen Reactions     Ace Inhibitors Cough     Altace [Ramipril] Unknown     Hydrochlorothiazide Unknown     Morphine Nausea and Vomiting     Penicillins Hives     Simvastatin Unknown     Sulfa (Sulfonamide Antibiotics) [Sulfa Drugs] Unknown     unknown     Trimethoprim        Review of Systems   A comprehensive review of 14 systems was done. Pertinent findings noted here and in history of present illness. All the rest negative.  Constitutional: Negative.  Negative for fever, chills, she has  activity change, appetite change and fatigue.   HENT: Negative for congestion and facial swelling.    Eyes: Negative for photophobia, redness and visual disturbance.   Respiratory: Negative for cough and chest tightness.  Compliant with her nebulizers no breathing difficulty  Cardiovascular: Negative for chest pain, palpitations and leg swelling.   Gastrointestinal: Negative for nausea, diarrhea, constipation, blood in stool and abdominal distention.   Genitourinary: Negative.    Musculoskeletal: Negative.  Reports her pain is controlled at rest.  She is still wheelchair-bound  Skin: Negative.    Neurological: Negative for dizziness, tremors, syncope, weakness, light-headedness and headaches.   Hematological: Does not bruise/bleed easily.   Psychiatric/Behavioral: Negative.  CONFUSION      Physical Exam     Blood pressure 124/73, pulse 78, temperature 98  F (36.7  C), resp. rate 18, height 1.524 m (5'), weight 43.8 kg (96 lb 9.6 oz), SpO2 95 %.      Constitutional: Oriented to person, place, and time and appears well-developed.   Weak and frail  HEENT:  Normocephalic and atraumatic.  Eyes: Conjunctivae and EOM are normal. Pupils are equal, round, and reactive to light. No discharge.  No scleral icterus. Nose normal.  Mouth/Throat: Oropharynx is clear and moist. No oropharyngeal exudate.    NECK: Normal range of motion. Neck supple. No JVD present. No tracheal deviation present. No thyromegaly present.   CARDIOVASCULAR: Normal rate, regular rhythm and intact distal pulses.  Exam reveals no gallop and no friction rub.  Systolic murmur present.  PULMONARY: Effort normal and breath sounds normal. No respiratory distress.No Wheezing or rales.  ABDOMEN: Soft. Bowel sounds are normal. No distension and no mass.  There is no tenderness. There is no rebound and no guarding. No HSM.  MUSCULOSKELETAL: Normal range of motion. No edema and no tenderness. Mild kyphosis, no tenderness.  LYMPH NODES: Has no cervical, supraclavicular, axillary and groin adenopathy.   NEUROLOGICAL: Alert and oriented to person, place, and time. No cranial nerve deficit.  Normal muscle tone. Coordination normal.   GENITOURINARY: Deferred exam.  SKIN: Skin is warm and dry. No rash noted. No erythema. No pallor.   EXTREMITIES: No cyanosis, no clubbing, no edema. No Deformity.  PSYCHIATRIC: Normal mood, affect and behavior.      Lab Results     Recent Results (from the past 240 hour(s))   SARS-COV2 (COVID-19) Virus RT-PCR    Collection Time: 08/12/21  9:00 AM    Specimen: Nasopharyngeal; Swab   Result Value Ref Range    SARS CoV2 PCR Negative Negative                      \    MEDICAL EQUIPMENT NEEDS:  Walker     DISCHARGE PLAN/FACE TO FACE:  I certify that services are/were furnished while this patient was under the care of a physician and that a physician or an allowed non-physician practitioner (NPP), had a face-to-face encounter that meets the physician face-to-face encounter requirements. The encounter was in whole, or in part, related to the primary reason for home health. The patient is confined to his/her home and needs intermittent skilled nursing, physical therapy, speech-language pathology, or the continued need for occupational therapy. A plan of care has  been established by a physician and is periodically reviewed by a physician.  Date of Face-to-Face Encounter:  8/18/2021     I certify that, based on my findings, the following services are medically necessary home health services: HHC HHA/RN and PT/OT to evaluate and treat    My clinical findings support the need for the above skilled services because: patient will be discharging to home. Patient will need assistance with medication management and performing IADLs and ADLs effectively and safely.     Patient to re-establish plan of care with their PCP within 7 days after leaving TCU.           Sincerely,        ALEXIS Chandra

## 2021-08-18 NOTE — PROGRESS NOTES
[facility] GERIATRIC SERVICES       Patient Aleisha Singh  MRN: [id number]    [facility]    Reason for Visit     Chief Complaint   Patient presents with     Discharge Summary Nursing Home       Code Status     CPR/Full code     Assessment     COVID-19 positive status in a patient has been fully vaccinated  COPD  Back pain with spinal stenosis  Left sacral fracture  History of a fall  Generalized weakness    History     Patient is a very pleasant 89 year old female who is admitted to TCU  Patient presented to the hospital after she fell.  She was noted to have left sacral fracture.  She is weightbearing as tolerated.  Pain management was optimized and she was discharged to the TCU.  Unfortunately in the nursing home she was noted to have COVID-19 positive status.  She has baseline COPD and uses inhalers.  However clinically she was in isolation and remains asymptomatic she has not been febrile.  No cough or congestion reported subsequently her Covid testing was negative and her contact isolation has been lifted after she has been asymptomatic.  She has chronic back pain with spinal stenosis.  She has chronic anemia last hemoglobin was 10.  She is underweight and due to poor oral intake she has been started on supplementation  BIMS 7/15 MOOD stable      Past Medical & Surgical History     PAST MEDICAL HISTORY:   Past Medical History:   Diagnosis Date     Acute pancreatitis      Arthritis      ASHD (arteriosclerotic heart disease)     80% mid LAD, 70% mid distal LAD, 90% L Cx     B12 deficiency      Bilateral cataracts     History -surgery completed     CAD S/P percutaneous coronary angioplasty 04/2015    RCA, diagonal     CHF (congestive heart failure) (H)      COPD (chronic obstructive pulmonary disease) (H)      Depression      Dermatitis     Left back-itchy rash     Diverticulitis      GERD (gastroesophageal reflux disease)      Idiopathic pancreatitis      Left wrist fracture 10/01/2015     Lung nodule       Osteopenia      Pancreas divisum      Retinal detachment      STEMI (ST elevation myocardial infarction) (H) 04/2015      PAST SURGICAL HISTORY:   has a past surgical history that includes appendectomy; Total Hip Arthroplasty (Right); Endoscopic retrograde cholangiopancreatogram (03/2011); Cataract Extraction (Left); Orif Toe Fracture; Hysterectomy; Angioplasty (04/2015); and Laparoscopic cholecystectomy (N/A, 1/22/2019).      Past Social History     Reviewed,  reports that she has never smoked. She has never used smokeless tobacco. She reports current alcohol use. She reports that she does not use drugs.    Family History     Reviewed, and family history includes Coronary Artery Disease in her father; Diabetes in her mother; Heart Disease in her father.    Medication List   Post Discharge Medication Reconciliation Status: Post Discharge Medication Reconciliation Status: discharge medications reconciled, continue medications without change.  Current Outpatient Medications   Medication     acetaminophen (TYLENOL) 500 MG tablet     acetaminophen-codeine (TYLENOL #3) 300-30 MG tablet     acetaminophen-codeine (TYLENOL #3) 300-30 MG tablet     aspirin 81 MG EC tablet     carboxymethylcellulose sodium (REFRESH TEARS OPHT)     cholecalciferol, vitamin D3, 1,000 unit tablet     cyanocobalamin 1,000 mcg/mL injection     estradioL (ESTRACE) 0.01 % (0.1 mg/gram) vaginal cream     ferrous sulfate 325 (65 FE) MG tablet     FOLIC ACID/MULTIVIT-MIN/LUTEIN (CENTRUM SILVER ORAL)     furosemide (LASIX) 20 MG tablet     ipratropium-albuterol (COMBIVENT RESPIMAT)  MCG/ACT inhaler     isosorbide mononitrate (IMDUR) 30 MG 24 hr tablet     loratadine (CLARITIN) 10 mg tablet     metoprolol tartrate (LOPRESSOR) 25 MG tablet     mirtazapine (REMERON) 7.5 MG tablet     nitroglycerin (NITROSTAT) 0.4 MG SL tablet     omeprazole (PRILOSEC) 20 MG capsule     polyethylene glycol (MIRALAX) 17 gram packet     vit A/vit C/vit E/zinc/copper  (PRESERVISION AREDS ORAL)     No current facility-administered medications for this visit.       Allergies     Allergies   Allergen Reactions     Ace Inhibitors Cough     Altace [Ramipril] Unknown     Hydrochlorothiazide Unknown     Morphine Nausea and Vomiting     Penicillins Hives     Simvastatin Unknown     Sulfa (Sulfonamide Antibiotics) [Sulfa Drugs] Unknown     unknown     Trimethoprim        Review of Systems   A comprehensive review of 14 systems was done. Pertinent findings noted here and in history of present illness. All the rest negative.  Constitutional: Negative.  Negative for fever, chills, she has  activity change, appetite change and fatigue.   HENT: Negative for congestion and facial swelling.    Eyes: Negative for photophobia, redness and visual disturbance.   Respiratory: Negative for cough and chest tightness.  Compliant with her nebulizers no breathing difficulty  Cardiovascular: Negative for chest pain, palpitations and leg swelling.   Gastrointestinal: Negative for nausea, diarrhea, constipation, blood in stool and abdominal distention.   Genitourinary: Negative.    Musculoskeletal: Negative.  Reports her pain is controlled at rest.  She is still wheelchair-bound  Skin: Negative.    Neurological: Negative for dizziness, tremors, syncope, weakness, light-headedness and headaches.   Hematological: Does not bruise/bleed easily.   Psychiatric/Behavioral: Negative.  CONFUSION      Physical Exam     Blood pressure 124/73, pulse 78, temperature 98  F (36.7  C), resp. rate 18, height 1.524 m (5'), weight 43.8 kg (96 lb 9.6 oz), SpO2 95 %.      Constitutional: Oriented to person, place, and time and appears well-developed.   Weak and frail  HEENT:  Normocephalic and atraumatic.  Eyes: Conjunctivae and EOM are normal. Pupils are equal, round, and reactive to light. No discharge.  No scleral icterus. Nose normal. Mouth/Throat: Oropharynx is clear and moist. No oropharyngeal exudate.    NECK: Normal  range of motion. Neck supple. No JVD present. No tracheal deviation present. No thyromegaly present.   CARDIOVASCULAR: Normal rate, regular rhythm and intact distal pulses.  Exam reveals no gallop and no friction rub.  Systolic murmur present.  PULMONARY: Effort normal and breath sounds normal. No respiratory distress.No Wheezing or rales.  ABDOMEN: Soft. Bowel sounds are normal. No distension and no mass.  There is no tenderness. There is no rebound and no guarding. No HSM.  MUSCULOSKELETAL: Normal range of motion. No edema and no tenderness. Mild kyphosis, no tenderness.  LYMPH NODES: Has no cervical, supraclavicular, axillary and groin adenopathy.   NEUROLOGICAL: Alert and oriented to person, place, and time. No cranial nerve deficit.  Normal muscle tone. Coordination normal.   GENITOURINARY: Deferred exam.  SKIN: Skin is warm and dry. No rash noted. No erythema. No pallor.   EXTREMITIES: No cyanosis, no clubbing, no edema. No Deformity.  PSYCHIATRIC: Normal mood, affect and behavior.      Lab Results     Recent Results (from the past 240 hour(s))   SARS-COV2 (COVID-19) Virus RT-PCR    Collection Time: 08/12/21  9:00 AM    Specimen: Nasopharyngeal; Swab   Result Value Ref Range    SARS CoV2 PCR Negative Negative                      \    MEDICAL EQUIPMENT NEEDS:  Walker     DISCHARGE PLAN/FACE TO FACE:  I certify that services are/were furnished while this patient was under the care of a physician and that a physician or an allowed non-physician practitioner (NPP), had a face-to-face encounter that meets the physician face-to-face encounter requirements. The encounter was in whole, or in part, related to the primary reason for home health. The patient is confined to his/her home and needs intermittent skilled nursing, physical therapy, speech-language pathology, or the continued need for occupational therapy. A plan of care has been established by a physician and is periodically reviewed by a physician.  Date of  Face-to-Face Encounter:  8/18/2021     I certify that, based on my findings, the following services are medically necessary home health services: [facility] HHA/RN and PT/OT to evaluate and treat    My clinical findings support the need for the above skilled services because: patient will be discharging to home. Patient will need assistance with medication management and performing IADLs and ADLs effectively and safely.     Patient to re-establish plan of care with their PCP within 7 days after leaving TCU.

## 2021-08-19 ENCOUNTER — LAB REQUISITION (OUTPATIENT)
Dept: LAB | Facility: CLINIC | Age: 86
End: 2021-08-19

## 2021-08-19 DIAGNOSIS — U07.1 COVID-19: ICD-10-CM

## 2021-08-19 PROCEDURE — U0005 INFEC AGEN DETEC AMPLI PROBE: HCPCS | Performed by: FAMILY MEDICINE

## 2021-08-20 ENCOUNTER — LAB REQUISITION (OUTPATIENT)
Dept: LAB | Facility: CLINIC | Age: 86
End: 2021-08-20
Payer: COMMERCIAL

## 2021-08-20 DIAGNOSIS — U07.1 COVID-19: ICD-10-CM

## 2021-08-20 LAB — SARS-COV-2 RNA RESP QL NAA+PROBE: NEGATIVE

## 2021-08-24 ENCOUNTER — LAB REQUISITION (OUTPATIENT)
Dept: LAB | Facility: CLINIC | Age: 86
End: 2021-08-24
Payer: COMMERCIAL

## 2021-08-24 DIAGNOSIS — U07.1 COVID-19: ICD-10-CM

## 2021-09-27 ENCOUNTER — LAB REQUISITION (OUTPATIENT)
Dept: LAB | Facility: CLINIC | Age: 86
End: 2021-09-27
Payer: COMMERCIAL

## 2021-09-27 DIAGNOSIS — D64.9 ANEMIA, UNSPECIFIED: ICD-10-CM

## 2021-09-27 DIAGNOSIS — M81.0 AGE-RELATED OSTEOPOROSIS WITHOUT CURRENT PATHOLOGICAL FRACTURE: ICD-10-CM

## 2021-09-27 DIAGNOSIS — I10 ESSENTIAL (PRIMARY) HYPERTENSION: ICD-10-CM

## 2021-09-27 LAB
ANION GAP SERPL CALCULATED.3IONS-SCNC: 15 MMOL/L (ref 5–18)
BUN SERPL-MCNC: 17 MG/DL (ref 8–28)
CALCIUM SERPL-MCNC: 9.2 MG/DL (ref 8.5–10.5)
CHLORIDE BLD-SCNC: 106 MMOL/L (ref 98–107)
CO2 SERPL-SCNC: 23 MMOL/L (ref 22–31)
CREAT SERPL-MCNC: 1.15 MG/DL (ref 0.6–1.1)
ERYTHROCYTE [DISTWIDTH] IN BLOOD BY AUTOMATED COUNT: 13.1 % (ref 10–15)
GFR SERPL CREATININE-BSD FRML MDRD: 40 ML/MIN/1.73M2
GLUCOSE BLD-MCNC: 133 MG/DL (ref 70–125)
HCT VFR BLD AUTO: 33.7 % (ref 35–47)
HGB BLD-MCNC: 11.2 G/DL (ref 11.7–15.7)
MCH RBC QN AUTO: 34 PG (ref 26.5–33)
MCHC RBC AUTO-ENTMCNC: 33.2 G/DL (ref 31.5–36.5)
MCV RBC AUTO: 102 FL (ref 78–100)
PLATELET # BLD AUTO: 222 10E3/UL (ref 150–450)
POTASSIUM BLD-SCNC: 3.8 MMOL/L (ref 3.5–5)
RBC # BLD AUTO: 3.29 10E6/UL (ref 3.8–5.2)
SODIUM SERPL-SCNC: 144 MMOL/L (ref 136–145)
TSH SERPL DL<=0.005 MIU/L-ACNC: 1.22 UIU/ML (ref 0.3–5)
WBC # BLD AUTO: 6.8 10E3/UL (ref 4–11)

## 2021-09-27 PROCEDURE — 80048 BASIC METABOLIC PNL TOTAL CA: CPT | Mod: ORL | Performed by: FAMILY MEDICINE

## 2021-09-27 PROCEDURE — 85027 COMPLETE CBC AUTOMATED: CPT | Mod: ORL | Performed by: FAMILY MEDICINE

## 2021-09-27 PROCEDURE — 84443 ASSAY THYROID STIM HORMONE: CPT | Mod: ORL | Performed by: FAMILY MEDICINE

## 2021-09-27 PROCEDURE — 36415 COLL VENOUS BLD VENIPUNCTURE: CPT | Mod: ORL | Performed by: FAMILY MEDICINE

## 2021-09-27 PROCEDURE — 82306 VITAMIN D 25 HYDROXY: CPT | Mod: ORL | Performed by: FAMILY MEDICINE

## 2021-09-28 LAB — DEPRECATED CALCIDIOL+CALCIFEROL SERPL-MC: 72 UG/L (ref 30–80)

## 2021-10-09 ENCOUNTER — HEALTH MAINTENANCE LETTER (OUTPATIENT)
Age: 86
End: 2021-10-09

## 2021-12-19 ENCOUNTER — HOSPITAL ENCOUNTER (EMERGENCY)
Facility: HOSPITAL | Age: 86
Discharge: HOME | End: 2021-12-19
Attending: EMERGENCY MEDICINE | Admitting: EMERGENCY MEDICINE
Payer: COMMERCIAL

## 2021-12-19 ENCOUNTER — APPOINTMENT (OUTPATIENT)
Dept: CT IMAGING | Facility: HOSPITAL | Age: 86
End: 2021-12-19
Attending: EMERGENCY MEDICINE
Payer: COMMERCIAL

## 2021-12-19 ENCOUNTER — APPOINTMENT (OUTPATIENT)
Dept: RADIOLOGY | Facility: HOSPITAL | Age: 86
End: 2021-12-19
Attending: EMERGENCY MEDICINE
Payer: COMMERCIAL

## 2021-12-19 VITALS
SYSTOLIC BLOOD PRESSURE: 149 MMHG | OXYGEN SATURATION: 92 % | HEART RATE: 87 BPM | WEIGHT: 96 LBS | BODY MASS INDEX: 18.85 KG/M2 | HEIGHT: 60 IN | DIASTOLIC BLOOD PRESSURE: 72 MMHG | RESPIRATION RATE: 29 BRPM | TEMPERATURE: 97.8 F

## 2021-12-19 DIAGNOSIS — Z86.79 HISTORY OF CORONARY ARTERY DISEASE: ICD-10-CM

## 2021-12-19 DIAGNOSIS — R07.9 CHEST PAIN, UNSPECIFIED TYPE: ICD-10-CM

## 2021-12-19 DIAGNOSIS — J40 BRONCHITIS: ICD-10-CM

## 2021-12-19 DIAGNOSIS — Z87.09 HISTORY OF COPD: ICD-10-CM

## 2021-12-19 LAB
ANION GAP SERPL CALCULATED.3IONS-SCNC: 11 MMOL/L (ref 5–18)
BASOPHILS # BLD AUTO: 0.1 10E3/UL (ref 0–0.2)
BASOPHILS NFR BLD AUTO: 1 %
BNP SERPL-MCNC: 580 PG/ML (ref 0–167)
BUN SERPL-MCNC: 16 MG/DL (ref 8–28)
CALCIUM SERPL-MCNC: 9.5 MG/DL (ref 8.5–10.5)
CHLORIDE BLD-SCNC: 104 MMOL/L (ref 98–107)
CO2 SERPL-SCNC: 27 MMOL/L (ref 22–31)
CREAT SERPL-MCNC: 1.52 MG/DL (ref 0.6–1.1)
D DIMER PPP FEU-MCNC: 2.51 UG/ML FEU (ref 0–0.5)
EOSINOPHIL # BLD AUTO: 0.6 10E3/UL (ref 0–0.7)
EOSINOPHIL NFR BLD AUTO: 8 %
ERYTHROCYTE [DISTWIDTH] IN BLOOD BY AUTOMATED COUNT: 12.5 % (ref 10–15)
FLUAV RNA SPEC QL NAA+PROBE: NEGATIVE
FLUBV RNA RESP QL NAA+PROBE: NEGATIVE
GFR SERPL CREATININE-BSD FRML MDRD: 29 ML/MIN/1.73M2
GLUCOSE BLD-MCNC: 137 MG/DL (ref 70–125)
HCT VFR BLD AUTO: 35.5 % (ref 35–47)
HGB BLD-MCNC: 11.6 G/DL (ref 11.7–15.7)
HOLD SPECIMEN: NORMAL
IMM GRANULOCYTES # BLD: 0 10E3/UL
IMM GRANULOCYTES NFR BLD: 0 %
LYMPHOCYTES # BLD AUTO: 1.8 10E3/UL (ref 0.8–5.3)
LYMPHOCYTES NFR BLD AUTO: 25 %
MCH RBC QN AUTO: 33.8 PG (ref 26.5–33)
MCHC RBC AUTO-ENTMCNC: 32.7 G/DL (ref 31.5–36.5)
MCV RBC AUTO: 104 FL (ref 78–100)
MONOCYTES # BLD AUTO: 0.4 10E3/UL (ref 0–1.3)
MONOCYTES NFR BLD AUTO: 6 %
NEUTROPHILS # BLD AUTO: 4.3 10E3/UL (ref 1.6–8.3)
NEUTROPHILS NFR BLD AUTO: 60 %
NRBC # BLD AUTO: 0 10E3/UL
NRBC BLD AUTO-RTO: 0 /100
PLATELET # BLD AUTO: 221 10E3/UL (ref 150–450)
POTASSIUM BLD-SCNC: 4.4 MMOL/L (ref 3.5–5)
RBC # BLD AUTO: 3.43 10E6/UL (ref 3.8–5.2)
SARS-COV-2 RNA RESP QL NAA+PROBE: NEGATIVE
SODIUM SERPL-SCNC: 142 MMOL/L (ref 136–145)
TROPONIN I SERPL-MCNC: <0.01 NG/ML (ref 0–0.29)
TROPONIN I SERPL-MCNC: <0.01 NG/ML (ref 0–0.29)
WBC # BLD AUTO: 7.1 10E3/UL (ref 4–11)

## 2021-12-19 PROCEDURE — 71045 X-RAY EXAM CHEST 1 VIEW: CPT

## 2021-12-19 PROCEDURE — 83880 ASSAY OF NATRIURETIC PEPTIDE: CPT | Performed by: EMERGENCY MEDICINE

## 2021-12-19 PROCEDURE — 80048 BASIC METABOLIC PNL TOTAL CA: CPT | Performed by: EMERGENCY MEDICINE

## 2021-12-19 PROCEDURE — 99285 EMERGENCY DEPT VISIT HI MDM: CPT | Mod: 25

## 2021-12-19 PROCEDURE — 85379 FIBRIN DEGRADATION QUANT: CPT | Performed by: EMERGENCY MEDICINE

## 2021-12-19 PROCEDURE — 36415 COLL VENOUS BLD VENIPUNCTURE: CPT | Performed by: EMERGENCY MEDICINE

## 2021-12-19 PROCEDURE — 250N000011 HC RX IP 250 OP 636: Performed by: EMERGENCY MEDICINE

## 2021-12-19 PROCEDURE — 84484 ASSAY OF TROPONIN QUANT: CPT | Mod: 91 | Performed by: EMERGENCY MEDICINE

## 2021-12-19 PROCEDURE — 96375 TX/PRO/DX INJ NEW DRUG ADDON: CPT

## 2021-12-19 PROCEDURE — 93005 ELECTROCARDIOGRAM TRACING: CPT | Performed by: EMERGENCY MEDICINE

## 2021-12-19 PROCEDURE — 85025 COMPLETE CBC W/AUTO DIFF WBC: CPT | Performed by: EMERGENCY MEDICINE

## 2021-12-19 PROCEDURE — C9803 HOPD COVID-19 SPEC COLLECT: HCPCS

## 2021-12-19 PROCEDURE — 94640 AIRWAY INHALATION TREATMENT: CPT

## 2021-12-19 PROCEDURE — 71275 CT ANGIOGRAPHY CHEST: CPT | Mod: 59

## 2021-12-19 PROCEDURE — 70360 X-RAY EXAM OF NECK: CPT

## 2021-12-19 PROCEDURE — 87636 SARSCOV2 & INF A&B AMP PRB: CPT | Performed by: EMERGENCY MEDICINE

## 2021-12-19 PROCEDURE — 96365 THER/PROPH/DIAG IV INF INIT: CPT | Mod: 59

## 2021-12-19 PROCEDURE — 250N000009 HC RX 250: Performed by: EMERGENCY MEDICINE

## 2021-12-19 RX ORDER — METHYLPREDNISOLONE SODIUM SUCCINATE 125 MG/2ML
125 INJECTION, POWDER, LYOPHILIZED, FOR SOLUTION INTRAMUSCULAR; INTRAVENOUS ONCE
Status: COMPLETED | OUTPATIENT
Start: 2021-12-19 | End: 2021-12-19

## 2021-12-19 RX ORDER — DOXYCYCLINE 100 MG/1
100 CAPSULE ORAL 2 TIMES DAILY
Qty: 28 CAPSULE | Refills: 0 | Status: SHIPPED | OUTPATIENT
Start: 2021-12-19 | End: 2021-12-19

## 2021-12-19 RX ORDER — IPRATROPIUM BROMIDE AND ALBUTEROL SULFATE 2.5; .5 MG/3ML; MG/3ML
3 SOLUTION RESPIRATORY (INHALATION) ONCE
Status: COMPLETED | OUTPATIENT
Start: 2021-12-19 | End: 2021-12-19

## 2021-12-19 RX ORDER — FENTANYL CITRATE 50 UG/ML
25 INJECTION, SOLUTION INTRAMUSCULAR; INTRAVENOUS ONCE
Status: COMPLETED | OUTPATIENT
Start: 2021-12-19 | End: 2021-12-19

## 2021-12-19 RX ORDER — DOXYCYCLINE 100 MG/1
100 CAPSULE ORAL 2 TIMES DAILY
Qty: 14 CAPSULE | Refills: 0 | Status: SHIPPED | OUTPATIENT
Start: 2021-12-19 | End: 2021-12-26

## 2021-12-19 RX ORDER — DOXYCYCLINE 100 MG/10ML
100 INJECTION, POWDER, LYOPHILIZED, FOR SOLUTION INTRAVENOUS ONCE
Status: COMPLETED | OUTPATIENT
Start: 2021-12-19 | End: 2021-12-19

## 2021-12-19 RX ORDER — IOPAMIDOL 755 MG/ML
75 INJECTION, SOLUTION INTRAVASCULAR ONCE
Status: COMPLETED | OUTPATIENT
Start: 2021-12-19 | End: 2021-12-19

## 2021-12-19 RX ORDER — MORPHINE SULFATE 4 MG/ML
2 INJECTION, SOLUTION INTRAMUSCULAR; INTRAVENOUS ONCE
Status: DISCONTINUED | OUTPATIENT
Start: 2021-12-19 | End: 2021-12-19

## 2021-12-19 RX ADMIN — IOPAMIDOL 75 ML: 755 INJECTION, SOLUTION INTRAVENOUS at 16:44

## 2021-12-19 RX ADMIN — IPRATROPIUM BROMIDE AND ALBUTEROL SULFATE 3 ML: .5; 3 SOLUTION RESPIRATORY (INHALATION) at 13:55

## 2021-12-19 RX ADMIN — METHYLPREDNISOLONE SODIUM SUCCINATE 125 MG: 125 INJECTION, POWDER, FOR SOLUTION INTRAMUSCULAR; INTRAVENOUS at 15:08

## 2021-12-19 RX ADMIN — FENTANYL CITRATE 25 MCG: 50 INJECTION INTRAMUSCULAR; INTRAVENOUS at 15:08

## 2021-12-19 RX ADMIN — DOXYCYCLINE 100 MG: 100 INJECTION, POWDER, LYOPHILIZED, FOR SOLUTION INTRAVENOUS at 19:04

## 2021-12-19 ASSESSMENT — ENCOUNTER SYMPTOMS
COUGH: 1
ROS GI COMMENTS: POSITIVE FOR LOOSE STOOL.
SORE THROAT: 1
COLOR CHANGE: 0
VOICE CHANGE: 1
TROUBLE SWALLOWING: 0
SHORTNESS OF BREATH: 1
CHILLS: 0
FEVER: 0
VOMITING: 0

## 2021-12-19 ASSESSMENT — MIFFLIN-ST. JEOR: SCORE: 746.95

## 2021-12-19 NOTE — ED NOTES
Patient up to bedside commode with assist of 1. Patient O2 sats down to 88%, but recovered once back in bed.

## 2021-12-19 NOTE — ED PROVIDER NOTES
EMERGENCY DEPARTMENT ENCOUNTER      NAME: Aleisha Singh  AGE: 89 year old female  YOB: 1925  MRN: [id number]  EVALUATION DATE & TIME: 12/19/2021  1:08 PM    PCP: Laura Ruiz    ED PROVIDER: Silvia Dickson M.D.      CHIEF COMPLAINT     Chief Complaint   Patient presents with     Chest Pain         FINAL IMPRESSION:     1. Bronchitis    2. Chest pain, unspecified type    3. History of COPD    4. History of coronary artery disease          MEDICAL DECISION MAKING:       Pertinent Labs & Imaging studies reviewed. (See chart for details)    89 year old female presents to the Emergency Department for evaluation of chest pain    ED Course as of 12/19/21 1918   Sun Dec 19, 2021   1339 Patient presents with daughter.  She was doing well this morning she started coughing all of a sudden and states I cannot breathe and her chest started hurting.  She was given nitroglycerin which helped minimally.  She feels like she has phlegm stuck in the throat and cannot cough it up.  She was not eating when these happen.  There was no cyanosis or near syncope.   1339 She has been doing relatively well but because her blood pressure has been low they decrease her Lasix from half a pill to 1 pill every other day to half a pill   1340 After a coughing episode she became horse   1340 On examination patient is very hoarse.  She is able to swallow her own saliva.  There is no tripoding.  There decreased breath sounds bilaterally.  Her trachea is midline.   1340 Differential diagnoses include but not limited to pneumothorax congestive heart failure COPD epiglottitis perforation choking episode among others.   1341 Will place patient in cardiac losartan blood pressure monitor.   1341 She is not requiring any extra oxygen.   1342 Given that she complains of pain will be by touch her pain medication.   1449 Labs bnp 580  Hemoglobin 11.6  Troponin negative   1450 Neck xray no acute   1451 Cxr no new findings   1451 Will start  solumedrol for copd exacerbation   1529 3:30 PM hoarseness significantly improved  Able to cough now  No respiratory distress  Elevated ddimer  Will order CT   1532 BNP on 11/2020 755  Clinically does not appear volume overloaded   1750 CT chest no pulmonary embolism resolving pneumonia mucous plugging no significant changes from previous.  Patient appears more comfortable.   1751 Pain started after she started coughing.  No pneumothorax.  She does have a history of coronary disease.  Initial troponin negative.  Discussed with her daughter results of the CT and the lab work discussed with her admission for observation or discharge with close follow-up.  Patient states she wants to go home.  We will do a delta troponin and EKG   1813 CT reveals progressive mucoid impaction.  No consolidation.   1813 She has been able to cough.  No choking episodes here.  Wants to go home.   1825 I spoke with Dr. Kennedy he was able to review the CT.  Recommends doxycycline albuterol nebulizer machine and follow-up with pulmonology.  Daughter was updated they do have the knee with ice machine she does it twice a day.  Was started doxycycline here in emergency department.  Patient states she is hungry we will order her something to eat.   1906 WBC: 7.1   1916 Clinical impression and decision making 89-year-old female with known history of COPD and coronary disease presents complaining of sudden onset of chest pain after she coughed.  She is subsequently lost her voice.  There was no cyanosis no need for Heimlich maneuver.  Tommy here was quite hoarse.  Decreased breath sounds.  Chest x-ray and lateral neck x-ray reveals no acute abnormalities no pneumothorax.  She was given albuterol.  Given the history of COPD given Solu-Medrol and a patch of fentanyl.  On repeat evaluation she feels much better was able to cough with more strength.  IV hydration is reversible.   1917 EKG x2 and troponin x2 are negative.  D-dimer elevated and given  "the sudden onset of chest pain or shortness of breath I did a CT that reveals no PE but mucoid blocks.  I think this is likely the cause of the sudden onset of cough and chest pain or shortness of breath.  Spoke with pulmonology who recommends albuterol which she already has and doxycycline.  She was given her first dose here in emergency department.  She is negative for Covid.   1917 Discussed with her daughter admission to the hospital for further evaluation and observation given her age but patient is quite sharp and wants to go home.  I think this is reasonable.  She has mild elevation in her BNP but no evidence of congestive heart failure.  No lower extremity edema no JVD.  She is on Lasix and this has been decreased secondary to low blood pressures.  Daughter is very reliable very.  Patient discharged in stable condition declined admission.         Vital Signs: hypertensive  EKG: sinus PAC Normal anterior R wave progression normal axis inverted T avl  Imaging: ct chest no PE mucoid plugs  Home Meds: reviewed  ED meds/abx: doxycycline albuterol solumedro fentanyl  Fluids: oral    Labs  K 4.4  Cr 1.52  Wbc 7.1  Hgb 11.6  Platelets 221        Review of Previous Records  Geriatrics Office Visit 8/6/21    HPI:    Aleisha Singh is a 89 year old female wit hx of HTN, CAD with hx MI, CHF, seen in the ED on 7/25/2021 for back pain from a fall some days earlier. She was diagnosed with left sacral fracture and spinal stenosis. Per ED note \"spoke with [facility] PA, who recommended follow-up in clinic (either [facility] or [facility]). Consider pain management consult for injection. Patient not a good surgical candidate and patient and her daughter denied any interest in surgery given her age\".  She was \"offered admission for pain control and PT / OT, however patient prefers to discharged home.  She is feeling better after Tylenol with codeine and a lidocaine patch was applied\". She was discharged to " home with recommendation for follow up with ortho/spine. She was seen again in the ED on 7/30/2021 for increasing pain, unable to manage at home. Arrangements were made for TCU admission on 7/30/2021 for PT, OT, nursing cares, medical management and monitoring.   Today:  She was admitted here to TCU from the ED on 7/30/2021. She had a surveillance COVID-19 test done in the facility on 7/31/2021 that resulted positive. Per Epic, it appears there are two tests done on same day with different collection times, one is positive and one is negative. Details unclear. She is treated as positive. She has been vaccinated and has no symptoms but was isolated per protocol in the facility. She had a repeat test on 8/3/2021 which resulted neg, she remains in isolation with COVID-19 facility protocols.      She is seen today, has no complaints. No fever, cough, shortness of breath, chest pain, hypoxia. No sx of COVID pneumonia. Her appetite is fair to good. No nausea, vomiting, diarrhea or constipation. She denies abdominal pain. No urinary issues. Does have some back pain at times, not currently. Has prn tylenol or tylenol with codeine. She is working with therapy. No new vision or hearing problems. She lives with her daughter, plans to return there after TCU stay.     ASSESSMENT/PLAN:  1. Back pain. Spinal stenosis. Conservative management. Trial of therapies. Follow up with ortho/spine.   2. Left sacral fracture. Fall at home. Here for therapies. WBAT. Adequate pain management  3. HTN. On Metoprolol. Monitor BPs in TCU.  4. CAD. Hx MI and stents. On isosorbide, lasix, aspirin. No complaints of chest pain or shortness of breath. Monitor.  5. COVID-19 positive. She is in isolation. Asymptomatic, had been vaccinated.  6. Anemia. Continue PTA iron. Last hgb at 10.1.   7. COPD. Stable, has combivent inhaler. Monitor closely.  8. Code status is full code.     ECHO 2018  Narrative & Impression    No previous study for comparison.     Left ventricle ejection fraction is normal. The estimated left ventricular ejection fraction is 55%.    Normal left ventricular size and systolic function.    E/e' > 15, suggesting elevated LV filling pressures.    Aortic Valve: Mild to moderate aortic stenosis. Mild aortic regurgitation.     Stress Test 1/2019  Study Result    Narrative & Impression     The pharmacologic nuclear stress test is negative for inducible myocardial ischemia or infarction.    The left ventricular ejection fraction is greater than 70%.    There is no prior study available.    The findings of this examination were communicated to Dr. Jean-Paul Mc.          Consults  Pulmonology, Dr. Kennedy    ED COURSE   1:30 PM I met with the patient to gather history and to perform my initial exam. I discussed the plan for care while in the Emergency Department. PPE (gloves, eye protection, surgical cap, N95 mask, and surgical mask) was worn by me during patient encounters while patient wore mask.   2:38 PM I re-evaluated patient and updated her on results. Voice is less hoarse.  5:48 PM I updated the patient with her results.  6:03 PM updated does not want to be admitted  7:08 PM prescription sent to pharmacy.  See minocycline here.  States she is happy with her son to eat 75% on room air appears quite comfortable.    At the conclusion of the encounter I discussed the results of all of the tests and the disposition. The questions were answered. The patient and daughter acknowledged understanding and was agreeable with the care plan.         MEDICATIONS GIVEN IN THE EMERGENCY:     Medications   doxycycline (VIBRAMYCIN) 100 mg vial to attach to  mL bag (100 mg Intravenous New Bag 12/19/21 4714)   ipratropium - albuterol 0.5 mg/2.5 mg/3 mL (DUONEB) neb solution 3 mL (3 mLs Nebulization Given 12/19/21 5467)   methylPREDNISolone sodium succinate (solu-MEDROL) injection 125 mg (125 mg Intravenous Given 12/19/21 0938)   fentaNYL (PF) (SUBLIMAZE)  injection 25 mcg (25 mcg Intravenous Given 12/19/21 8221)   iopamidol (ISOVUE-370) solution 75 mL (75 mLs Intravenous Given 12/19/21 3585)       NEW PRESCRIPTIONS STARTED AT TODAY'S ER VISIT     Current Discharge Medication List      START taking these medications    Details   doxycycline hyclate (VIBRAMYCIN) 100 MG capsule Take 1 capsule (100 mg) by mouth 2 times daily for 7 days  Qty: 14 capsule, Refills: 0                =================================================================    HPI     Patient information was obtained from: Patient    Use of : N/A        Aleisha Singh is a 89 year old female with a history of CAD s/p 6 stents, MI, CHF, COPD, who presents by private vehicle for evaluation of chest pain.    Patient reports she was feeling fine today and went about her normal daily routine. 1 hour PTA she started coughing and afterwards developed chest pain and shortness of breath. She denies choking on any food. No cyanosis or pallor. Patient's daughter gave her nitroglycerin with improvement to pain however pain did not resolve and has persisted. Patient subsequently became hoarse. She endorses a soreness in her throat but denies difficulty swallowing. She does note feeling like there is phlegm stuck in her throat.     Patient recently had her Lasix decreased due to low blood pressure. Patient reports recent loose stool but otherwise denies fever, chills, vomiting, leg swelling, or additional medical concerns or complaints at this time.      REVIEW OF SYSTEMS   Review of Systems   Constitutional: Negative for chills and fever.   HENT: Positive for sore throat and voice change (hoarse). Negative for trouble swallowing.         Positive for globus sensation in throat.   Respiratory: Positive for cough and shortness of breath.    Cardiovascular: Negative for leg swelling.   Gastrointestinal: Negative for vomiting.        Positive for loose stool.   Skin: Negative for color change and pallor.    All other systems reviewed and are negative.    PAST MEDICAL HISTORY:     Past Medical History:   Diagnosis Date     Acute pancreatitis      Arthritis      ASHD (arteriosclerotic heart disease)     80% mid LAD, 70% mid distal LAD, 90% L Cx     B12 deficiency      Bilateral cataracts     History -surgery completed     CAD S/P percutaneous coronary angioplasty 04/2015    RCA, diagonal     CHF (congestive heart failure) (H)      COPD (chronic obstructive pulmonary disease) (H)      Depression      Dermatitis     Left back-itchy rash     Diverticulitis      GERD (gastroesophageal reflux disease)      Idiopathic pancreatitis      Left wrist fracture 10/01/2015     Lung nodule      Osteopenia      Pancreas divisum      Retinal detachment      STEMI (ST elevation myocardial infarction) (H) 04/2015       PAST SURGICAL HISTORY:     Past Surgical History:   Procedure Laterality Date     ANGIOPLASTY  04/2015    Per patient report 6 cardiac stents placed     APPENDECTOMY       CATARACT EXTRACTION Left      ENDOSCOPIC RETROGRADE CHOLANGIOPANCREATOGRAM  03/2011     HYSTERECTOMY       LAPAROSCOPIC CHOLECYSTECTOMY N/A 1/22/2019    Procedure: CHOLECYSTECTOMY, LAPAROSCOPIC;  Surgeon: Luke Mehta MD;  Location: [facility] [place];  Service: General     ORIF TOE FRACTURE       TOTAL HIP ARTHROPLASTY Right          CURRENT MEDICATIONS:   doxycycline hyclate (VIBRAMYCIN) 100 MG capsule  acetaminophen (TYLENOL) 500 MG tablet  acetaminophen-codeine (TYLENOL #3) 300-30 MG tablet  acetaminophen-codeine (TYLENOL #3) 300-30 MG tablet  aspirin 81 MG EC tablet  carboxymethylcellulose sodium (REFRESH TEARS OPHT)  cholecalciferol, vitamin D3, 1,000 unit tablet  cyanocobalamin 1,000 mcg/mL injection  estradioL (ESTRACE) 0.01 % (0.1 mg/gram) vaginal cream  ferrous sulfate 325 (65 FE) MG tablet  FOLIC ACID/MULTIVIT-MIN/LUTEIN (CENTRUM SILVER ORAL)  furosemide (LASIX) 20 MG tablet  ipratropium-albuterol (COMBIVENT RESPIMAT)  MCG/ACT  inhaler  isosorbide mononitrate (IMDUR) 30 MG 24 hr tablet  loratadine (CLARITIN) 10 mg tablet  metoprolol tartrate (LOPRESSOR) 25 MG tablet  mirtazapine (REMERON) 7.5 MG tablet  nitroglycerin (NITROSTAT) 0.4 MG SL tablet  omeprazole (PRILOSEC) 20 MG capsule  polyethylene glycol (MIRALAX) 17 gram packet  vit A/vit C/vit E/zinc/copper (PRESERVISION AREDS ORAL)         ALLERGIES:     Allergies   Allergen Reactions     Ace Inhibitors Cough     Altace [Ramipril] Unknown     Hydrochlorothiazide Unknown     Morphine Nausea and Vomiting     Penicillins Hives     Simvastatin Unknown     Sulfa (Sulfonamide Antibiotics) [Sulfa Drugs] Unknown     unknown     Trimethoprim        FAMILY HISTORY:     Family History   Problem Relation Age of Onset     Diabetes Mother      Heart Disease Father      Coronary Artery Disease Father        SOCIAL HISTORY:     Social History     Socioeconomic History     Marital status:      Spouse name: Not on file     Number of children: Not on file     Years of education: Not on file     Highest education level: Not on file   Occupational History     Not on file   Tobacco Use     Smoking status: Never Smoker     Smokeless tobacco: Never Used   Substance and Sexual Activity     Alcohol use: Yes     Comment: Alcoholic Drinks/day: on special occasions/holidays     Drug use: No     Sexual activity: Not Currently   Other Topics Concern     Not on file   Social History Narrative     Not on file     Social Determinants of Health     Financial Resource Strain: Not on file   Food Insecurity: Not on file   Transportation Needs: Not on file   Physical Activity: Not on file   Stress: Not on file   Social Connections: Not on file   Intimate Partner Violence: Not on file   Housing Stability: Not on file       VITALS:   BP (!) 170/80   Pulse 83   Temp 97.8  F (36.6  C) (Axillary)   Resp 20   Ht 1.524 m (5')   Wt 43.5 kg (96 lb)   SpO2 95%   BMI 18.75 kg/m      PHYSICAL EXAM     Physical Exam  Vitals  and nursing note reviewed.   Constitutional:       General: She is not in acute distress.     Appearance: She is not ill-appearing, toxic-appearing or diaphoretic.      Comments: Fragile elderly female  Weak cough  No respiratory distress   Eyes:      Extraocular Movements: Extraocular movements intact.   Cardiovascular:      Rate and Rhythm: Normal rate and regular rhythm.   Pulmonary:      Breath sounds: Examination of the right-upper field reveals decreased breath sounds. Examination of the left-upper field reveals decreased breath sounds. Examination of the right-middle field reveals decreased breath sounds. Examination of the left-middle field reveals decreased breath sounds. Examination of the right-lower field reveals decreased breath sounds. Examination of the left-lower field reveals decreased breath sounds. Decreased breath sounds present.   Musculoskeletal:      Cervical back: Neck supple.      Right lower leg: No edema.      Left lower leg: No edema.   Skin:     General: Skin is warm.      Capillary Refill: Capillary refill takes less than 2 seconds.      Coloration: Skin is pale.   Neurological:      General: No focal deficit present.      Mental Status: She is alert.         Physical Exam   Constitutional: Elderly female, sitting in bed, appears shortness of breath, fraile    Head: Atraumatic.     Nose: Nose normal.     Mouth/Throat: Oropharynx is clear and moist.  Patient is missing multiple teeth but none are new from today.  No loose teeth that had caused her to aspirate.    Eyes: EOM are normal. Pupils are equal, round, and reactive to light.     Ears: Bilateral pearly white TMs    Neck: Normal range of motion. Neck supple.     Cardiovascular: Normal rate, regular rhythm and normal heart sounds.      Pulmonary/Chest: Normal effort  and breath sounds normal. Decreased breath sounds bilaterally    Abdominal: Soft. Bowel sounds are normal.    Musculoskeletal: Normal range of motion.     Neurological:  No deficits    Lymphatics: No lower extremity edema    Skin: Skin is warm and dry.     Psychiatric: Normal mood and affect. Behavior is normal.       LAB:     All pertinent labs reviewed and interpreted.  Labs Ordered and Resulted from Time of ED Arrival to Time of ED Departure   BASIC METABOLIC PANEL - Abnormal       Result Value    Sodium 142      Potassium 4.4      Chloride 104      Carbon Dioxide (CO2) 27      Anion Gap 11      Urea Nitrogen 16      Creatinine 1.52 (*)     Calcium 9.5      Glucose 137 (*)     GFR Estimate 29 (*)    B-TYPE NATRIURETIC PEPTIDE ([facility] ONLY) - Abnormal     (*)    CBC WITH PLATELETS AND DIFFERENTIAL - Abnormal    WBC Count 7.1      RBC Count 3.43 (*)     Hemoglobin 11.6 (*)     Hematocrit 35.5       (*)     MCH 33.8 (*)     MCHC 32.7      RDW 12.5      Platelet Count 221      % Neutrophils 60      % Lymphocytes 25      % Monocytes 6      % Eosinophils 8      % Basophils 1      % Immature Granulocytes 0      NRBCs per 100 WBC 0      Absolute Neutrophils 4.3      Absolute Lymphocytes 1.8      Absolute Monocytes 0.4      Absolute Eosinophils 0.6      Absolute Basophils 0.1      Absolute Immature Granulocytes 0.0      Absolute NRBCs 0.0     D DIMER QUANTITATIVE - Abnormal    D-Dimer Quantitative 2.51 (*)    TROPONIN I - Normal    Troponin I <0.01     INFLUENZA A/B & SARS-COV2 PCR MULTIPLEX - Normal    Influenza A PCR Negative      Influenza B PCR Negative      SARS CoV2 PCR Negative     TROPONIN I - Normal    Troponin I <0.01          RADIOLOGY:     Reviewed all pertinent imaging. Please see official radiology report.  CT Chest Pulmonary Embolism w Contrast   Final Result   IMPRESSION:   1.  There has been little change in the lobulated, low dense nodule in the right upper lobe in the one year interval. Again, this may reflect a focal area of chronic, mucoid impaction or indolent malignancy. Given the findings in the right lower lobe,    favor the former.   2.  Interval  resolution of the scattered mild peribronchiolar nodules further inferiorly in the right upper lobe consistent with resolved mild bronchopneumonia.   3.  Progressive, significant mucoid impaction in the right lower lobe and to a lesser extent in the right middle lobe.   4.  No change in the trace bilateral pleural effusions and subpleural, chronic atelectasis, right greater than left.   5.  No evidence of pulmonary emboli.      Neck soft tissue XR   Final Result   IMPRESSION: The prevertebral soft tissues are within normal. The appears normal. No soft tissue gas. Degenerative change C-spine.      XR Chest Port 1 View   Final Result   IMPRESSION: Hyperinflation and some scattered fibrosis consistent with COPD. Some slight atelectasis in the right lung base. No significant new findings. The nodular opacity right lung apex seen on prior CT poorly appreciated by plain film.           EKG:     EKG #1  Sinus PAC    Time:771014    Ventricular rate 82 bmp  Axis normal  HI interval 184 ms  QRS duration 70 ms  QT//443 ms    Compared to previous EKG on 11/28/2020 PAC new  I have independently reviewed and interpreted the EKG(s) documented above.    EKG #2  Sinus normal anterior progression normal axis    Time: 190888    Ventricular rate 85 bmp  Axis normal  HI interval 188 ms  QRS duration 68 ms  QT//459 ms    Compared to previous EKG on no significant changes compared to previous  PROCEDURES:     Procedures      I, Chen Simon, am serving as a scribe to document services personally performed by Dr. Dickson based on my observation and the provider's statements to me. I, Silvia Dickson MD attest that Chen Simon is acting in a scribe capacity, has observed my performance of the services and has documented them in accordance with my direction.    Silvia Dickson M.D.  Emergency Medicine  [facility] EMERGENCY DEPARTMENT  [address]  [id number]  [phone]  Dept: [phone]     Silvia Dickson MD  12/19/21 1918

## 2021-12-20 LAB
ATRIAL RATE - MUSE: 85 BPM
DIASTOLIC BLOOD PRESSURE - MUSE: 79 MMHG
INTERPRETATION ECG - MUSE: NORMAL
P AXIS - MUSE: 68 DEGREES
PR INTERVAL - MUSE: 188 MS
QRS DURATION - MUSE: 68 MS
QT - MUSE: 386 MS
QTC - MUSE: 459 MS
R AXIS - MUSE: 68 DEGREES
SYSTOLIC BLOOD PRESSURE - MUSE: 168 MMHG
T AXIS - MUSE: 81 DEGREES
VENTRICULAR RATE- MUSE: 85 BPM

## 2021-12-20 NOTE — DISCHARGE INSTRUCTIONS
Read and follow the discharge instructions.    The CT shows that you have a lot of mucus.  The lung specialist is recommended to use the inhaler twice a day.  And to take the antibiotics as instructed.  You were given your first dose here in emergency department started with the rest tomorrow.    Continue the Lasix this will help you with the congestion.    It is very important that you call your primary care doctor tomorrow to make a follow-up appointment this could be a virtual visit.    The pulmonologist will also like you to follow-up with him.  Call the number provided for follow-up.    Return or call 911 for chest pain difficulty breathing or any other concerns.

## 2021-12-22 ENCOUNTER — LAB REQUISITION (OUTPATIENT)
Dept: LAB | Facility: CLINIC | Age: 86
End: 2021-12-22
Payer: COMMERCIAL

## 2021-12-22 DIAGNOSIS — N17.9 ACUTE KIDNEY FAILURE, UNSPECIFIED (H): ICD-10-CM

## 2021-12-22 PROCEDURE — 80048 BASIC METABOLIC PNL TOTAL CA: CPT | Mod: ORL | Performed by: STUDENT IN AN ORGANIZED HEALTH CARE EDUCATION/TRAINING PROGRAM

## 2021-12-23 LAB
ANION GAP SERPL CALCULATED.3IONS-SCNC: 12 MMOL/L (ref 5–18)
BUN SERPL-MCNC: 23 MG/DL (ref 8–28)
CALCIUM SERPL-MCNC: 9.3 MG/DL (ref 8.5–10.5)
CHLORIDE BLD-SCNC: 105 MMOL/L (ref 98–107)
CO2 SERPL-SCNC: 25 MMOL/L (ref 22–31)
CREAT SERPL-MCNC: 1.31 MG/DL (ref 0.6–1.1)
GFR SERPL CREATININE-BSD FRML MDRD: 37 ML/MIN/1.73M2
GLUCOSE BLD-MCNC: 113 MG/DL (ref 70–125)
POTASSIUM BLD-SCNC: 4.2 MMOL/L (ref 3.5–5)
SODIUM SERPL-SCNC: 142 MMOL/L (ref 136–145)

## 2022-01-01 ENCOUNTER — HEALTH MAINTENANCE LETTER (OUTPATIENT)
Age: 87
End: 2022-01-01

## 2022-01-12 ENCOUNTER — TRANSCRIBE ORDERS (OUTPATIENT)
Dept: OTHER | Age: 87
End: 2022-01-12

## 2022-01-12 DIAGNOSIS — R13.10 DYSPHAGIA: Primary | ICD-10-CM

## 2022-01-17 ENCOUNTER — HOSPITAL ENCOUNTER (OUTPATIENT)
Dept: RADIOLOGY | Facility: HOSPITAL | Age: 87
Discharge: HOME | End: 2022-01-17
Attending: STUDENT IN AN ORGANIZED HEALTH CARE EDUCATION/TRAINING PROGRAM | Admitting: STUDENT IN AN ORGANIZED HEALTH CARE EDUCATION/TRAINING PROGRAM
Payer: COMMERCIAL

## 2022-01-17 ENCOUNTER — HOSPITAL ENCOUNTER (OUTPATIENT)
Dept: SPEECH THERAPY | Facility: HOSPITAL | Age: 87
Setting detail: THERAPIES SERIES
End: 2022-01-17
Attending: STUDENT IN AN ORGANIZED HEALTH CARE EDUCATION/TRAINING PROGRAM
Payer: COMMERCIAL

## 2022-01-17 DIAGNOSIS — R13.10 DYSPHAGIA: ICD-10-CM

## 2022-01-17 PROCEDURE — 92611 MOTION FLUOROSCOPY/SWALLOW: CPT | Mod: GN

## 2022-01-17 PROCEDURE — 74230 X-RAY XM SWLNG FUNCJ C+: CPT

## 2022-01-17 RX ORDER — BARIUM SULFATE 400 MG/ML
SUSPENSION ORAL ONCE
Status: COMPLETED | OUTPATIENT
Start: 2022-01-17 | End: 2022-01-17

## 2022-01-17 RX ADMIN — BARIUM SULFATE 60 ML: 400 SUSPENSION ORAL at 10:44

## 2022-01-17 RX ADMIN — BARIUM SULFATE 20 ML: 400 SUSPENSION ORAL at 10:44

## 2022-01-17 NOTE — PROGRESS NOTES
Outpatient Video Fluoroscopic Swallow Study(VFSS) with Speech Therapist:     01/17/22 1000   General Information   Type Of Visit Initial   Referring Physician Porten   Orders Evaluate And Treat   Onset Of Illness/injury Or Date Of Surgery 01/17/22   Pertinent History of Current Problem/OT: Additional Occupational Profile Info Coughing with drinking and sometimes when taking pills.    General Observations alert and cooperative, able to follow directions   VFSS Eval: Thin Liquid Texture Trial   Mode of Presentation, Thin Liquid self-fed;cup   Preparatory Phase WFL   Oral Phase, Thin Liquid WFL   Pharyngeal Phase, Thin Liquid WFL;Residue in valleculae   Rosenbek's Penetration Aspiration Scale: Thin Liquid Trial Results 1 - no aspiration, contrast does not enter airway   VFSS Eval: Mildly Thick Liquids    Mode of Presentation cup;self-fed   Preparatory Phase WFL   Oral Phase WFL   Pharyngeal Phase WFL;Residue in valleculae   Rosenbek's Penetration Aspiration Scale 1 - no aspiration, contrast does not enter airway   VFSS Eval: Moderately Thick Liquids    Mode of Presentation spoon;fed by clinician   Preparatory Phase WFL   Oral Phase WFL   Pharyngeal Phase Residue in valleculae   Rosenbek's Penetration Aspiration Scale 2 - contrast enters airway, remains above the vocal cords, no residue remains (penetration)   VFSS Eval: Puree Solid Texture Trial   Mode of Presentation, Puree spoon   Preparatory Phase WFL   Oral Phase, Puree WFL   Pharyngeal Phase, Puree WFL;Residue in valleculae   Rosenbek's Penetration Aspiration Scale: Puree Food Trial Results 1 - no aspiration, contrast does not enter airway   VFSS Eval: Regular Texture Trial (Solid)   Mode of Presentation fed by clinician   Order of Presentation barium coated cracker   Preparatory Phase WFL   Oral Phase WFL   Pharyngeal Phase Residue in valleculae;WFL   Rosenbek's Penetration Aspiration Scale 1 - no aspiration, contrast does not enter airway   Swallow Eval:  Clinical Impressions   Diet texture recommendations Thin liquids (level 0);Regular diet   Clinical Impression Comments VFSS results: functional oral phase of swallow with reduced hyolaryngeal elevation and excursion causing vallecular residue with all textures moreso with thicker textures compared to thinner textures. Epiglottis completely inverts. There was trace penetration of moderately thick liquids seen during the first presentation of this assessment and no penetration seen during the rest of the study. Pt appears safe for regular diet with thin liquids and to co ntinue to alternate solids and liquids to help reduce vallecular stasis. No ST warranted at this time.    Total Session Time   SLP Eval: VideoFluoroscopic Swallow function Minutes (42597) 15   Total Evaluation Time 15

## 2022-02-04 ENCOUNTER — OFFICE VISIT (OUTPATIENT)
Dept: PULMONOLOGY | Facility: OTHER | Age: 87
End: 2022-02-04
Payer: COMMERCIAL

## 2022-02-04 VITALS
OXYGEN SATURATION: 98 % | BODY MASS INDEX: 19.53 KG/M2 | WEIGHT: 100 LBS | SYSTOLIC BLOOD PRESSURE: 116 MMHG | HEART RATE: 76 BPM | DIASTOLIC BLOOD PRESSURE: 65 MMHG

## 2022-02-04 DIAGNOSIS — J31.0 CHRONIC RHINITIS: ICD-10-CM

## 2022-02-04 DIAGNOSIS — R13.10 DYSPHAGIA, UNSPECIFIED TYPE: ICD-10-CM

## 2022-02-04 DIAGNOSIS — R06.00 DYSPNEA, UNSPECIFIED TYPE: Primary | ICD-10-CM

## 2022-02-04 DIAGNOSIS — J42 CHRONIC BRONCHITIS, UNSPECIFIED CHRONIC BRONCHITIS TYPE (H): ICD-10-CM

## 2022-02-04 DIAGNOSIS — K21.9 GASTROESOPHAGEAL REFLUX DISEASE WITHOUT ESOPHAGITIS: ICD-10-CM

## 2022-02-04 PROCEDURE — 94760 N-INVAS EAR/PLS OXIMETRY 1: CPT | Performed by: INTERNAL MEDICINE

## 2022-02-04 PROCEDURE — 99204 OFFICE O/P NEW MOD 45 MIN: CPT | Mod: 25 | Performed by: INTERNAL MEDICINE

## 2022-02-04 RX ORDER — IPRATROPIUM BROMIDE AND ALBUTEROL SULFATE 2.5; .5 MG/3ML; MG/3ML
SOLUTION RESPIRATORY (INHALATION)
Qty: 180 ML | Refills: 12 | Status: SHIPPED | OUTPATIENT
Start: 2022-02-04

## 2022-02-04 NOTE — PATIENT INSTRUCTIONS
1. Duonebs twice a day and increase to every six hours as needed  2. Chin tuck maneuver with oral intake  3. Avoid eating close to bed time  4. Raise the head of the bed  5. Continue omeprazole daily   6. Atrovent nasal spray two sprays each nostril twice a day  7. Continue daily diuresis   8. Please contact us if you want any questions , clinic number [phone] , nurse Monet  9. Follow up in 4 months

## 2022-02-04 NOTE — PROGRESS NOTES
PULMONARY OUTPATIENT CONSULT NOTE        Assessment:      1. S/p treatment of acute bronchitis/RLL pneumonia  Patient was seen in ED on 12/19/2021. Finished course of doxycycline for 10 days.   Patient had a VSS, study showed no aspiration with different consistencies.   2. Mild dysphagia  Video swallow study 1/17/22 showed no significant risk of aspiration with all consistencies.   Patient has mild cough after drinking a glass of water in clinic.   Chin tuck maneuver was discussed.   3. Chronic bronchitis   Continue scheduled bronchodilators. Duonebs twice a day and increase the frequency as needed.   SpO2 at rest on RA was 96%, dropped to 92% after walking 150 feet, no need for O2 supplementation with activities.   4. Lobulated RUL nodule   Stability of 1.2 x 1.6 x 2.0 cm lobulated nodule when comparing images from cervical spine CT scan done on 10/5/2018 with chest CT scan done on 12/19/2021.   No tobacco use in the past.   5. Rhinitis   Trial of atrovent nasal spray.   6. HTN, CAD, HFpEF  Titrate BP meds, continue daily lasix   7. GERD  Avoid eating close to bed time  Raise the head of the bed   Continue omeprazole daily    Plan:      1. Duonebs twice a day and increase to every six hours as needed  2. Chin tuck maneuver with oral intake  3. Avoid eating close to bed time  4. Raise the head of the bed  5. Continue omeprazole daily   6. Atrovent nasal spray two sprays each nostril twice a day  7. Continue daily diuresis   8. Please contact us if you want any questions , clinic number [phone] , nurse Healy  9. Follow up in 4 months      Karthik Schmid  Pulmonary / Critical Care  February 4, 2022        CC:     Chief Complaint   Patient presents with     Follow Up      HPI:       Aleisha Singh is a 89 year old female who presents after ED evaluation.   Patient has history of HTN, CAD, HFpEF, frequent falls, severe spinal canal stenosis L4-L5, sacral fracture, right hip arthroplasty, GERD, non  smoker.   Patient was seen in ED on 12/19/21 for evaluation of shortness of breath and cough. Patient was afebrile, hypertensive, adequate oxygenation, chest CT scan showed no pulmonary embolism, patchy peribronchial opacities, mucous plugs in RLL, unchanged RUL lobulated nodule.   Patient was treated for acute bronchitis/pneumonia and reactive airway with systemic steroids and abx, in addition duonebs were prescribed.   Patient was recently evaluated by speech therapy in view of swallowing problems and cough post eating. VSS 1/17/22 showed no significant risk of aspiration with all consistencies.   Patient reports doing well after finishing steroids and antibiotics.  Uses duonebs twice a day.   Cough has improved, reports occasional wheezes.   Activity is limited, uses a walker to get around, her activity is limited to small distances, living room to bedroom.   Patient was previously prescribed with O2 supplementation, she declines it, she is concerned about tripping with the cord.   Denies orthopnea, PND, no swelling of LEs, uses lasix daily.   Reports runny nose.   Denies acid reflux symptoms with PPI daily. Reports cough with food intake.   Denies chocking sensation.   No sleeping problems.   No tobacco use in the past. 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         Past Medical History :     Past Medical History:   Diagnosis Date     Acute pancreatitis      Arthritis      ASHD (arteriosclerotic heart disease)     80% mid LAD, 70% mid distal LAD, 90% L Cx     B12 deficiency      Bilateral cataracts     History -surgery completed     CAD S/P percutaneous coronary angioplasty 04/2015    RCA, diagonal     CHF (congestive heart failure) (H)      COPD (chronic obstructive pulmonary disease) (H)      Depression      Dermatitis     Left back-itchy rash     Diverticulitis      GERD (gastroesophageal reflux disease)      Idiopathic pancreatitis      Left wrist fracture 10/01/2015      Lung nodule      Osteopenia      Pancreas divisum      Retinal detachment      STEMI (ST elevation myocardial infarction) (H) 04/2015        Medications:     Current Outpatient Medications   Medication     ipratropium - albuterol 0.5 mg/2.5 mg/3 mL (DUONEB) 0.5-2.5 (3) MG/3ML neb solution     acetaminophen (TYLENOL) 500 MG tablet     acetaminophen-codeine (TYLENOL #3) 300-30 MG tablet     acetaminophen-codeine (TYLENOL #3) 300-30 MG tablet     aspirin 81 MG EC tablet     carboxymethylcellulose sodium (REFRESH TEARS OPHT)     cholecalciferol, vitamin D3, 1,000 unit tablet     cyanocobalamin 1,000 mcg/mL injection     estradioL (ESTRACE) 0.01 % (0.1 mg/gram) vaginal cream     ferrous sulfate 325 (65 FE) MG tablet     FOLIC ACID/MULTIVIT-MIN/LUTEIN (CENTRUM SILVER ORAL)     furosemide (LASIX) 20 MG tablet     ipratropium-albuterol (COMBIVENT RESPIMAT)  MCG/ACT inhaler     isosorbide mononitrate (IMDUR) 30 MG 24 hr tablet     loratadine (CLARITIN) 10 mg tablet     metoprolol tartrate (LOPRESSOR) 25 MG tablet     mirtazapine (REMERON) 7.5 MG tablet     nitroglycerin (NITROSTAT) 0.4 MG SL tablet     omeprazole (PRILOSEC) 20 MG capsule     polyethylene glycol (MIRALAX) 17 gram packet     vit A/vit C/vit E/zinc/copper (PRESERVISION AREDS ORAL)     No current facility-administered medications for this visit.      Social History :     Social History     Socioeconomic History     Marital status:      Spouse name: Not on file     Number of children: Not on file     Years of education: Not on file     Highest education level: Not on file   Occupational History     Not on file   Tobacco Use     Smoking status: Never Smoker     Smokeless tobacco: Never Used   Substance and Sexual Activity     Alcohol use: Yes     Comment: Alcoholic Drinks/day: on special occasions/holidays     Drug use: No     Sexual activity: Not Currently   Other Topics Concern     Not on file   Social History Narrative     Not on file     Social  Determinants of Health     Financial Resource Strain: Not on file   Food Insecurity: Not on file   Transportation Needs: Not on file   Physical Activity: Not on file   Stress: Not on file   Social Connections: Not on file   Intimate Partner Violence: Not on file   Housing Stability: Not on file      Family History :     Family History   Problem Relation Age of Onset     Diabetes Mother      Heart Disease Father      Coronary Artery Disease Father        Review of Systems  A 12 point comprehensive review of systems was negative except as noted.     Objective:     /65   Pulse 76   Wt 45.4 kg (100 lb)   SpO2 98%   BMI 19.53 kg/m    Patient oxygen saturation on RA at rest is 96% room air   Oxygen saturation after ambulating 150ft on RA is 92%    Gen: awake, alert, no distress  HEENT: pale conjunctiva, dry mucosa  Neck: no thyromegaly, masses or JVD  Lungs: discrete ronchi, otherwise clear  CV: regular, no murmurs or gallops appreciated  Abdomen: soft, NT, BS wnl  Ext: no edema  Neuro: CN II-XII intact, non focal      Diagnostic tests:       IMAGES:     Echocardiogram 10/19/2018    No previous study for comparison.    Left ventricle ejection fraction is normal. The estimated left ventricular ejection fraction is 55%.    Normal left ventricular size and systolic function.    E/e' > 15, suggesting elevated LV filling pressures.    Aortic Valve: Mild to moderate aortic stenosis. Mild aortic regurgitation.    NM stress test 1/21/2019    The pharmacologic nuclear stress test is negative for inducible myocardial ischemia or infarction.    The left ventricular ejection fraction is greater than 70%.    There is no prior study available.    CT CHEST PULMONARY EMBOLISM W CONTRAST  LOCATION: [facility]  DATE/TIME: 12/19/2021 4:31 PM  INDICATION: Coughing with shortness of breath.  COMPARISON: Chest CTA 11/22/2020  FINDINGS:  ANGIOGRAM CHEST: Excellent opacification of pulmonary arteries and  branches. No evidence of pulmonary emboli. Thoracic aorta is normal caliber.   LUNGS AND PLEURA: No significant change in the lobulated low low dense nodule in the right upper lobe measuring approximately 1.8 x 1.7 x 1.2 cm. Another pleural-based tail posteriorly has developed with focal irregular pleural thickening superiorly and   lateral to it, unchanged. Patchy peribronchiolar groundglass opacities further inferiorly in the right lower lobe have cleared.  Patient's developed progressive, significant mucus impaction of most of the right lower lobe pulmonary bronchi and to a far lesser extent in the right middle lobe. Trace bilateral pleural effusions, right greater than left with associated subpleural atelectasis are unchanged. Diffuse mosaic appearance to the lung.  MEDIASTINUM/AXILLAE: No adenopathy.  CORONARY ARTERY CALCIFICATION: Moderate.  UPPER ABDOMEN: Prior cholecystectomy. Branches are heavily calcified.  MUSCULOSKELETAL: Bones are demineralized. No suspicious lesions.                                          IMPRESSION:  1.  There has been little change in the lobulated, low dense nodule in the right upper lobe in the one year interval. Again, this may reflect a focal area of chronic, mucoid impaction or indolent malignancy. Given the findings in the right lower lobe, favor the former.  2.  Interval resolution of the scattered mild peribronchiolar nodules further inferiorly in the right upper lobe consistent with resolved mild bronchopneumonia.  3.  Progressive, significant mucoid impaction in the right lower lobe and to a lesser extent in the right middle lobe.  4.  No change in the trace bilateral pleural effusions and subpleural, chronic atelectasis, right greater than left.  5.  No evidence of pulmonary emboli.    XR VIDEO SWALLOW WITH SLP OR OT  LOCATION: [facility]  DATE/TIME: 1/17/2022 10:38 AM  INDICATION: Difficulty swallowing.  COMPARISON: None.  FINDINGS:   Swallow  study with Speech Pathology using multiple barium thicknesses.   No penetration or aspiration with all barium consistencies.  Minimal under coating of the epiglottis with tiny that freely clears.  IMPRESSION:  1.  No significant risk for aspiration with all consistencies.

## 2022-02-04 NOTE — PROGRESS NOTES
Patient oxygen saturation on RA at rest is 96% room air   Oxygen saturation after ambulating 150ft on RA is 92%    Jillian March LPN

## 2022-02-05 RX ORDER — IPRATROPIUM BROMIDE 21 UG/1
2 SPRAY, METERED NASAL 2 TIMES DAILY
Qty: 30 ML | Refills: 12 | Status: SHIPPED | OUTPATIENT
Start: 2022-02-05

## 2022-06-15 ENCOUNTER — OFFICE VISIT (OUTPATIENT)
Dept: PULMONOLOGY | Facility: OTHER | Age: 87
End: 2022-06-15
Payer: COMMERCIAL

## 2022-06-15 VITALS — DIASTOLIC BLOOD PRESSURE: 60 MMHG | OXYGEN SATURATION: 96 % | SYSTOLIC BLOOD PRESSURE: 114 MMHG | HEART RATE: 86 BPM

## 2022-06-15 DIAGNOSIS — R91.8 PULMONARY NODULES: ICD-10-CM

## 2022-06-15 DIAGNOSIS — J41.0 SIMPLE CHRONIC BRONCHITIS (H): Primary | ICD-10-CM

## 2022-06-15 DIAGNOSIS — K21.9 GASTROESOPHAGEAL REFLUX DISEASE WITHOUT ESOPHAGITIS: ICD-10-CM

## 2022-06-15 PROCEDURE — 99214 OFFICE O/P EST MOD 30 MIN: CPT | Performed by: INTERNAL MEDICINE

## 2022-06-15 NOTE — PATIENT INSTRUCTIONS
Duonebs daily and increase to twice a day as needed  Chin tuck maneuver with oral intake  Avoid eating close to bed time  Raise the head of the bed  Continue omeprazole daily   Continue daily diuresis   Please contact us if you want any questions , clinic number [phone] , nurse Monet  Follow up in 6 months

## 2022-06-29 NOTE — PROGRESS NOTES
PULMONARY OUTPATIENT FOLLOW UP NOTE        Assessment:        1. Chronic bronchitis   Continue scheduled bronchodilators. Duonebs daily and increase the frequency as needed.   2. Lobulated RUL nodule   Stability of 1.2 x 1.6 x 2.0 cm lobulated nodule when comparing images from cervical spine CT scan done on 10/5/2018 with chest CT scan done on 12/19/2021.   No tobacco use in the past.   3. Mild dysphagia  Video swallow study 1/17/22 showed no significant risk of aspiration with all consistencies.   Patient has mild cough after drinking a glass of water in clinic.   Chin tuck maneuver was discussed.   4. HTN, CAD, HFpEF  Titrate BP meds, continue daily lasix   5. GERD  Avoid eating close to bed time  Raise the head of the bed   Continue omeprazole daily    Plan:      1. Duonebs daily and increase to twice a day as needed  2. Chin tuck maneuver with oral intake  3. Avoid eating close to bed time  4. Raise the head of the bed  5. Continue omeprazole daily   6. Continue daily diuresis   7. Please contact us if you want any questions , clinic number [phone] , nurse Healy  8. Follow up in 6 months      Karthik Schmid  Pulmonary / Critical Care  Mariya 15, 2022        CC:     Chief Complaint   Patient presents with     Follow Up     Dyspnea and chronic bronchitis       HPI:       Aleisha Singh is a 89 year old female who presents for follow up.   Patient has history of HTN, CAD, HFpEF, frequent falls, severe spinal canal stenosis L4-L5, sacral fracture, right hip arthroplasty, GERD, non smoker.   Doing well since last visit.   Mild shortness of breath, mild cough, she has reduced the frequency of nebulizer to daily.   Activity is limited, uses a walker to get around,   Denies orthopnea, PND, no swelling of LEs, uses lasix daily.   Denies acid reflux symptoms with PPI daily.   Chin tuck maneuver with food intake.   No sleeping problems.   No tobacco use in the past. 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         Past Medical History :     Past Medical History:   Diagnosis Date     Acute pancreatitis      Arthritis      ASHD (arteriosclerotic heart disease)     80% mid LAD, 70% mid distal LAD, 90% L Cx     B12 deficiency      Bilateral cataracts     History -surgery completed     CAD S/P percutaneous coronary angioplasty 04/2015    RCA, diagonal     CHF (congestive heart failure) (H)      COPD (chronic obstructive pulmonary disease) (H)      Depression      Dermatitis     Left back-itchy rash     Diverticulitis      GERD (gastroesophageal reflux disease)      Idiopathic pancreatitis      Left wrist fracture 10/01/2015     Lung nodule      Osteopenia      Pancreas divisum      Retinal detachment      STEMI (ST elevation myocardial infarction) (H) 04/2015        Medications:     Current Outpatient Medications   Medication     aspirin 81 MG EC tablet     carboxymethylcellulose sodium (REFRESH TEARS OPHT)     cholecalciferol, vitamin D3, 1,000 unit tablet     cyanocobalamin 1,000 mcg/mL injection     estradioL (ESTRACE) 0.01 % (0.1 mg/gram) vaginal cream     ferrous sulfate 325 (65 FE) MG tablet     FOLIC ACID/MULTIVIT-MIN/LUTEIN (CENTRUM SILVER ORAL)     furosemide (LASIX) 20 MG tablet     ipratropium (ATROVENT) 0.03 % nasal spray     ipratropium - albuterol 0.5 mg/2.5 mg/3 mL (DUONEB) 0.5-2.5 (3) MG/3ML neb solution     isosorbide mononitrate (IMDUR) 30 MG 24 hr tablet     loratadine (CLARITIN) 10 mg tablet     mirtazapine (REMERON) 7.5 MG tablet     omeprazole (PRILOSEC) 20 MG capsule     polyethylene glycol (MIRALAX) 17 gram packet     vit A/vit C/vit E/zinc/copper (PRESERVISION AREDS ORAL)     acetaminophen (TYLENOL) 500 MG tablet     acetaminophen-codeine (TYLENOL #3) 300-30 MG tablet     acetaminophen-codeine (TYLENOL #3) 300-30 MG tablet     ipratropium-albuterol (COMBIVENT RESPIMAT)  MCG/ACT inhaler     metoprolol tartrate (LOPRESSOR) 25 MG tablet     nitroglycerin  (NITROSTAT) 0.4 MG SL tablet     No current facility-administered medications for this visit.      Social History :     Social History     Socioeconomic History     Marital status:      Spouse name: Not on file     Number of children: Not on file     Years of education: Not on file     Highest education level: Not on file   Occupational History     Not on file   Tobacco Use     Smoking status: Never Smoker     Smokeless tobacco: Never Used   Substance and Sexual Activity     Alcohol use: Yes     Comment: Alcoholic Drinks/day: on special occasions/holidays     Drug use: No     Sexual activity: Not Currently   Other Topics Concern     Not on file   Social History Narrative     Not on file     Social Determinants of Health     Financial Resource Strain: Not on file   Food Insecurity: Not on file   Transportation Needs: Not on file   Physical Activity: Not on file   Stress: Not on file   Social Connections: Not on file   Intimate Partner Violence: Not on file   Housing Stability: Not on file      Family History :     Family History   Problem Relation Age of Onset     Diabetes Mother      Heart Disease Father      Coronary Artery Disease Father        Review of Systems  A 12 point comprehensive review of systems was negative except as noted.     Objective:     /60 (BP Location: Left arm, Patient Position: Chair, Cuff Size: Adult Regular)   Pulse 86   SpO2 96%       Gen: awake, alert, no distress  HEENT: pale conjunctiva, dry mucosa  Neck: no thyromegaly, masses or JVD  Lungs: clear  CV: regular, no murmurs or gallops appreciated  Abdomen: soft, NT, BS wnl  Ext: no edema  Neuro: CN II-XII intact, non focal      Diagnostic tests:       IMAGES:     Echocardiogram 10/19/2018    No previous study for comparison.    Left ventricle ejection fraction is normal. The estimated left ventricular ejection fraction is 55%.    Normal left ventricular size and systolic function.    E/e' > 15, suggesting elevated LV  filling pressures.    Aortic Valve: Mild to moderate aortic stenosis. Mild aortic regurgitation.    NM stress test 1/21/2019    The pharmacologic nuclear stress test is negative for inducible myocardial ischemia or infarction.    The left ventricular ejection fraction is greater than 70%.    There is no prior study available.    CT CHEST PULMONARY EMBOLISM W CONTRAST  LOCATION: [facility]  DATE/TIME: 12/19/2021 4:31 PM  INDICATION: Coughing with shortness of breath.  COMPARISON: Chest CTA 11/22/2020  FINDINGS:  ANGIOGRAM CHEST: Excellent opacification of pulmonary arteries and branches. No evidence of pulmonary emboli. Thoracic aorta is normal caliber.   LUNGS AND PLEURA: No significant change in the lobulated low low dense nodule in the right upper lobe measuring approximately 1.8 x 1.7 x 1.2 cm. Another pleural-based tail posteriorly has developed with focal irregular pleural thickening superiorly and   lateral to it, unchanged. Patchy peribronchiolar groundglass opacities further inferiorly in the right lower lobe have cleared.  Patient's developed progressive, significant mucus impaction of most of the right lower lobe pulmonary bronchi and to a far lesser extent in the right middle lobe. Trace bilateral pleural effusions, right greater than left with associated subpleural atelectasis are unchanged. Diffuse mosaic appearance to the lung.  MEDIASTINUM/AXILLAE: No adenopathy.  CORONARY ARTERY CALCIFICATION: Moderate.  UPPER ABDOMEN: Prior cholecystectomy. Branches are heavily calcified.  MUSCULOSKELETAL: Bones are demineralized. No suspicious lesions.                                          IMPRESSION:  1.  There has been little change in the lobulated, low dense nodule in the right upper lobe in the one year interval. Again, this may reflect a focal area of chronic, mucoid impaction or indolent malignancy. Given the findings in the right lower lobe, favor the former.  2.  Interval  resolution of the scattered mild peribronchiolar nodules further inferiorly in the right upper lobe consistent with resolved mild bronchopneumonia.  3.  Progressive, significant mucoid impaction in the right lower lobe and to a lesser extent in the right middle lobe.  4.  No change in the trace bilateral pleural effusions and subpleural, chronic atelectasis, right greater than left.  5.  No evidence of pulmonary emboli.    XR VIDEO SWALLOW WITH SLP OR OT  LOCATION: [facility]  DATE/TIME: 1/17/2022 10:38 AM  INDICATION: Difficulty swallowing.  COMPARISON: None.  FINDINGS:   Swallow study with Speech Pathology using multiple barium thicknesses.   No penetration or aspiration with all barium consistencies.  Minimal under coating of the epiglottis with tiny that freely clears.  IMPRESSION:  1.  No significant risk for aspiration with all consistencies.

## 2023-01-01 ENCOUNTER — VIRTUAL VISIT (OUTPATIENT)
Dept: PULMONOLOGY | Facility: CLINIC | Age: 88
End: 2023-01-01
Payer: COMMERCIAL

## 2023-01-01 ENCOUNTER — HEALTH MAINTENANCE LETTER (OUTPATIENT)
Age: 88
End: 2023-01-01

## 2023-01-01 DIAGNOSIS — J44.1 OBSTRUCTIVE CHRONIC BRONCHITIS WITH EXACERBATION (H): Primary | ICD-10-CM

## 2023-01-01 DIAGNOSIS — R91.8 PULMONARY NODULES: ICD-10-CM

## 2023-01-01 DIAGNOSIS — R13.10 DYSPHAGIA, UNSPECIFIED TYPE: ICD-10-CM

## 2023-01-01 PROCEDURE — 99214 OFFICE O/P EST MOD 30 MIN: CPT | Mod: 95 | Performed by: INTERNAL MEDICINE

## 2023-01-30 NOTE — PROGRESS NOTES
Aleisha is a 89 year old who is being evaluated via a billable telephone visit.      What phone number would you like to be contacted at? [phone]  How would you like to obtain your AVS? Mayrahart    Distant Location (provider location):  On-site  Phone call duration: 20 minutes    Zahra Shaffer VF

## 2023-02-06 NOTE — PATIENT INSTRUCTIONS
Duonebs daily and increase to twice a day as needed  Chin tuck maneuver with oral intake  Avoid eating close to bed time  Raise the head of the bed  Continue omeprazole daily   Continue daily diuresis   Please contact us if you want any questions , clinic number [phone] , nurse Geoff  Follow up in 6 months

## 2023-02-06 NOTE — PROGRESS NOTES
VIRTUAL PULMONARY NOTE        Assessment:        1. Chronic bronchitis   Continue scheduled bronchodilators. Duonebs daily and increase the frequency as needed.   2. Lobulated RUL nodule   Stability of 1.2 x 1.6 x 2.0 cm lobulated nodule when comparing images from cervical spine CT scan done on 10/5/2018 with chest CT scan done on 12/19/2021.   No tobacco use in the past.   3. Mild dysphagia  Video swallow study 1/17/22 showed no significant risk of aspiration with all consistencies.   Chin tuck maneuver was discussed.   4. HTN, CAD, HFpEF  Titrate BP meds, continue daily lasix   5. GERD  Avoid eating close to bed time  Raise the head of the bed   Continue omeprazole daily    Plan:      1. Duonebs daily and increase to twice a day as needed  2. Chin tuck maneuver with oral intake  3. Avoid eating close to bed time  4. Raise the head of the bed  5. Continue omeprazole daily   6. Continue daily diuresis   7. Please contact us if you want any questions , clinic number [phone] , nurse Tellez  8. Follow up in 6 months      TOTAL PHONE CALL VISIT : 20 minutes     Karthik Scmhid  Pulmonary / Critical Care  January 30, 2023        CC:     Follow up    HPI:       Aleisha Singh is a 89 year old female who presents for follow up.   Patient has history of HTN, CAD, HFpEF, frequent falls, severe spinal canal stenosis L4-L5, sacral fracture, right hip arthroplasty, GERD, non smoker.   Doing well since last visit.   Unchanged mild exertional dyspnea, uses nebulizer daily. Activity is limited, uses a walker to get around,  Mild cough, uses chin tuck maneuver with food intake. .    Denies orthopnea, PND, no swelling of LEs, uses lasix daily.   Denies acid reflux symptoms with PPI daily.   Chin tuck maneuver with food intake.   No sleeping problems.   No tobacco use in the past. 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         Past Medical History :     Past Medical History:    Diagnosis Date     Acute pancreatitis      Arthritis      ASHD (arteriosclerotic heart disease)     80% mid LAD, 70% mid distal LAD, 90% L Cx     B12 deficiency      Bilateral cataracts     History -surgery completed     CAD S/P percutaneous coronary angioplasty 04/2015    RCA, diagonal     CHF (congestive heart failure) (H)      COPD (chronic obstructive pulmonary disease) (H)      Depression      Dermatitis     Left back-itchy rash     Diverticulitis      GERD (gastroesophageal reflux disease)      Idiopathic pancreatitis      Left wrist fracture 10/01/2015     Lung nodule      Osteopenia      Pancreas divisum      Retinal detachment      STEMI (ST elevation myocardial infarction) (H) 04/2015        Medications:     Current Outpatient Medications   Medication     acetaminophen (TYLENOL) 500 MG tablet     acetaminophen-codeine (TYLENOL #3) 300-30 MG tablet     acetaminophen-codeine (TYLENOL #3) 300-30 MG tablet     aspirin 81 MG EC tablet     carboxymethylcellulose sodium (REFRESH TEARS OPHT)     cholecalciferol, vitamin D3, 1,000 unit tablet     cyanocobalamin 1,000 mcg/mL injection     estradioL (ESTRACE) 0.01 % (0.1 mg/gram) vaginal cream     ferrous sulfate 325 (65 FE) MG tablet     FOLIC ACID/MULTIVIT-MIN/LUTEIN (CENTRUM SILVER ORAL)     furosemide (LASIX) 20 MG tablet     ipratropium (ATROVENT) 0.03 % nasal spray     ipratropium - albuterol 0.5 mg/2.5 mg/3 mL (DUONEB) 0.5-2.5 (3) MG/3ML neb solution     ipratropium-albuterol (COMBIVENT RESPIMAT)  MCG/ACT inhaler     isosorbide mononitrate (IMDUR) 30 MG 24 hr tablet     loratadine (CLARITIN) 10 mg tablet     metoprolol tartrate (LOPRESSOR) 25 MG tablet     mirtazapine (REMERON) 7.5 MG tablet     nitroglycerin (NITROSTAT) 0.4 MG SL tablet     omeprazole (PRILOSEC) 20 MG capsule     polyethylene glycol (MIRALAX) 17 gram packet     vit A/vit C/vit E/zinc/copper (PRESERVISION AREDS ORAL)     No current facility-administered medications for this visit.       Social History :     Social History     Socioeconomic History     Marital status:      Spouse name: Not on file     Number of children: Not on file     Years of education: Not on file     Highest education level: Not on file   Occupational History     Not on file   Tobacco Use     Smoking status: Never     Smokeless tobacco: Never   Substance and Sexual Activity     Alcohol use: Yes     Comment: Alcoholic Drinks/day: on special occasions/holidays     Drug use: No     Sexual activity: Not Currently   Other Topics Concern     Not on file   Social History Narrative     Not on file     Social Determinants of Health     Financial Resource Strain: Not on file   Food Insecurity: Not on file   Transportation Needs: Not on file   Physical Activity: Not on file   Stress: Not on file   Social Connections: Not on file   Intimate Partner Violence: Not on file   Housing Stability: Not on file      Family History :     Family History   Problem Relation Age of Onset     Diabetes Mother      Heart Disease Father      Coronary Artery Disease Father        Review of Systems  A 12 point comprehensive review of systems was negative except as noted.     Objective:     There were no vitals taken for this visit.     Diagnostic tests:       IMAGES:     Echocardiogram 10/19/2018    No previous study for comparison.    Left ventricle ejection fraction is normal. The estimated left ventricular ejection fraction is 55%.    Normal left ventricular size and systolic function.    E/e' > 15, suggesting elevated LV filling pressures.    Aortic Valve: Mild to moderate aortic stenosis. Mild aortic regurgitation.    NM stress test 1/21/2019    The pharmacologic nuclear stress test is negative for inducible myocardial ischemia or infarction.    The left ventricular ejection fraction is greater than 70%.    There is no prior study available.    CT CHEST PULMONARY EMBOLISM W CONTRAST  LOCATION: [facility]  DATE/TIME:  12/19/2021 4:31 PM  INDICATION: Coughing with shortness of breath.  COMPARISON: Chest CTA 11/22/2020  FINDINGS:  ANGIOGRAM CHEST: Excellent opacification of pulmonary arteries and branches. No evidence of pulmonary emboli. Thoracic aorta is normal caliber.   LUNGS AND PLEURA: No significant change in the lobulated low low dense nodule in the right upper lobe measuring approximately 1.8 x 1.7 x 1.2 cm. Another pleural-based tail posteriorly has developed with focal irregular pleural thickening superiorly and   lateral to it, unchanged. Patchy peribronchiolar groundglass opacities further inferiorly in the right lower lobe have cleared.  Patient's developed progressive, significant mucus impaction of most of the right lower lobe pulmonary bronchi and to a far lesser extent in the right middle lobe. Trace bilateral pleural effusions, right greater than left with associated subpleural atelectasis are unchanged. Diffuse mosaic appearance to the lung.  MEDIASTINUM/AXILLAE: No adenopathy.  CORONARY ARTERY CALCIFICATION: Moderate.  UPPER ABDOMEN: Prior cholecystectomy. Branches are heavily calcified.  MUSCULOSKELETAL: Bones are demineralized. No suspicious lesions.                                          IMPRESSION:  1.  There has been little change in the lobulated, low dense nodule in the right upper lobe in the one year interval. Again, this may reflect a focal area of chronic, mucoid impaction or indolent malignancy. Given the findings in the right lower lobe, favor the former.  2.  Interval resolution of the scattered mild peribronchiolar nodules further inferiorly in the right upper lobe consistent with resolved mild bronchopneumonia.  3.  Progressive, significant mucoid impaction in the right lower lobe and to a lesser extent in the right middle lobe.  4.  No change in the trace bilateral pleural effusions and subpleural, chronic atelectasis, right greater than left.  5.  No evidence of pulmonary emboli.    XR VIDEO  SWALLOW WITH SLP OR OT  LOCATION: [facility]  DATE/TIME: 1/17/2022 10:38 AM  INDICATION: Difficulty swallowing.  COMPARISON: None.  FINDINGS:   Swallow study with Speech Pathology using multiple barium thicknesses.   No penetration or aspiration with all barium consistencies.  Minimal under coating of the epiglottis with tiny that freely clears.  IMPRESSION:  1.  No significant risk for aspiration with all consistencies.

## 2023-04-03 PROBLEM — K85.90 ACUTE PANCREATITIS WITHOUT NECROSIS OR INFECTION, UNSPECIFIED: Status: ACTIVE | Noted: 2018-04-17

## 2024-02-12 NOTE — ED TRIAGE NOTES
Pt with a hx of 6 stents,Mi, CHF developed  Chest and throat pain and SOB 30 mins ago with sob. Sats 94%.pt took one NTG and it helped a little bit but not much. pts voice is hoarse which is new for pt.  
n/a